# Patient Record
Sex: FEMALE | Race: BLACK OR AFRICAN AMERICAN | NOT HISPANIC OR LATINO | URBAN - METROPOLITAN AREA
[De-identification: names, ages, dates, MRNs, and addresses within clinical notes are randomized per-mention and may not be internally consistent; named-entity substitution may affect disease eponyms.]

---

## 2014-06-12 RX ORDER — SODIUM BICARBONATE 1 MEQ/ML
2 SYRINGE (ML) INTRAVENOUS
Qty: 0 | Refills: 0 | COMMUNITY
Start: 2014-06-12

## 2014-06-12 RX ORDER — SODIUM BICARBONATE 1 MEQ/ML
5 SYRINGE (ML) INTRAVENOUS
Qty: 0 | Refills: 0 | COMMUNITY
Start: 2014-06-12

## 2014-06-13 RX ORDER — ATORVASTATIN CALCIUM 80 MG/1
1 TABLET, FILM COATED ORAL
Qty: 0 | Refills: 0 | COMMUNITY
Start: 2014-06-13

## 2017-01-23 ENCOUNTER — OUTPATIENT (OUTPATIENT)
Dept: OUTPATIENT SERVICES | Facility: HOSPITAL | Age: 57
LOS: 1 days | End: 2017-01-23
Payer: COMMERCIAL

## 2017-01-23 VITALS
HEART RATE: 82 BPM | TEMPERATURE: 98 F | DIASTOLIC BLOOD PRESSURE: 86 MMHG | HEIGHT: 68 IN | RESPIRATION RATE: 18 BRPM | OXYGEN SATURATION: 99 % | SYSTOLIC BLOOD PRESSURE: 124 MMHG

## 2017-01-23 DIAGNOSIS — Z01.818 ENCOUNTER FOR OTHER PREPROCEDURAL EXAMINATION: ICD-10-CM

## 2017-01-23 DIAGNOSIS — N18.6 END STAGE RENAL DISEASE: ICD-10-CM

## 2017-01-23 DIAGNOSIS — N18.9 CHRONIC KIDNEY DISEASE, UNSPECIFIED: ICD-10-CM

## 2017-01-23 LAB
ANION GAP SERPL CALC-SCNC: 12 MMOL/L — SIGNIFICANT CHANGE UP (ref 5–17)
BLD GP AB SCN SERPL QL: SIGNIFICANT CHANGE UP
BUN SERPL-MCNC: 70 MG/DL — HIGH (ref 7–23)
CALCIUM SERPL-MCNC: 10.1 MG/DL — SIGNIFICANT CHANGE UP (ref 8.4–10.5)
CHLORIDE SERPL-SCNC: 102 MMOL/L — SIGNIFICANT CHANGE UP (ref 96–108)
CO2 SERPL-SCNC: 21 MMOL/L — LOW (ref 22–31)
CREAT SERPL-MCNC: 12.09 MG/DL — HIGH (ref 0.5–1.3)
GLUCOSE SERPL-MCNC: 123 MG/DL — HIGH (ref 70–99)
HCT VFR BLD CALC: 28.5 % — LOW (ref 34.5–45)
HGB BLD-MCNC: 8.1 G/DL — LOW (ref 11.5–15.5)
MCHC RBC-ENTMCNC: 23.6 PG — LOW (ref 27–34)
MCHC RBC-ENTMCNC: 28.6 GM/DL — LOW (ref 32–36)
MCV RBC AUTO: 82.5 FL — SIGNIFICANT CHANGE UP (ref 80–100)
PLATELET # BLD AUTO: 223 K/UL — SIGNIFICANT CHANGE UP (ref 150–400)
POTASSIUM SERPL-MCNC: 4.8 MMOL/L — SIGNIFICANT CHANGE UP (ref 3.5–5.3)
POTASSIUM SERPL-SCNC: 4.8 MMOL/L — SIGNIFICANT CHANGE UP (ref 3.5–5.3)
RBC # BLD: 3.46 M/UL — LOW (ref 3.8–5.2)
RBC # FLD: 23.3 % — HIGH (ref 10.3–14.5)
SODIUM SERPL-SCNC: 135 MMOL/L — SIGNIFICANT CHANGE UP (ref 135–145)
WBC # BLD: 6.6 K/UL — SIGNIFICANT CHANGE UP (ref 3.8–10.5)
WBC # FLD AUTO: 6.6 K/UL — SIGNIFICANT CHANGE UP (ref 3.8–10.5)

## 2017-01-23 PROCEDURE — 85027 COMPLETE CBC AUTOMATED: CPT

## 2017-01-23 PROCEDURE — 86901 BLOOD TYPING SEROLOGIC RH(D): CPT

## 2017-01-23 PROCEDURE — 36415 COLL VENOUS BLD VENIPUNCTURE: CPT

## 2017-01-23 PROCEDURE — 93005 ELECTROCARDIOGRAM TRACING: CPT

## 2017-01-23 PROCEDURE — 80048 BASIC METABOLIC PNL TOTAL CA: CPT

## 2017-01-23 PROCEDURE — 86900 BLOOD TYPING SEROLOGIC ABO: CPT

## 2017-01-23 PROCEDURE — G0463: CPT

## 2017-01-23 PROCEDURE — 86850 RBC ANTIBODY SCREEN: CPT

## 2017-01-23 PROCEDURE — 93010 ELECTROCARDIOGRAM REPORT: CPT

## 2017-01-23 NOTE — H&P PST ADULT - PMH
Breast CA, right    Chronic kidney disease (CKD)    Depression    Gout    Herniated lumbar intervertebral disc    Hypertension    Renal insufficiency    Spinal stenosis Anemia due to chronic kidney disease    Breast CA, right    Chronic kidney disease (CKD)    Depression    Gout    Herniated lumbar intervertebral disc    HLD (hyperlipidemia)    Hypertension    Renal insufficiency    Spinal stenosis

## 2017-01-23 NOTE — H&P PST ADULT - NSANTHOSAYNRD_GEN_A_CORE
No. SYED screening performed.  STOP BANG Legend: 0-2 = LOW Risk; 3-4 = INTERMEDIATE Risk; 5-8 = HIGH Risk

## 2017-01-23 NOTE — H&P PST ADULT - PROBLEM SELECTOR PLAN 1
scheduled left arm radiobephalic AV Fistula, possible right side  obtaining medical clearance  pre op instructions scheduled left arm radiocephalic AV Fistula, possible right side  obtaining medical clearance  pre op instructions

## 2017-02-02 NOTE — ASU DISCHARGE PLAN (ADULT/PEDIATRIC). - MEDICATION SUMMARY - MEDICATIONS TO TAKE
I will START or STAY ON the medications listed below when I get home from the hospital:    aspirin 81 mg oral delayed release tablet  -- 1 tab(s) by mouth once a day  -- Indication: For continuing home medications    acetaminophen-oxyCODONE 325 mg-5 mg oral tablet  -- 1 tab(s) by mouth every 4 hours, As Needed -for severe pain MDD:6  -- Caution federal law prohibits the transfer of this drug to any person other  than the person for whom it was prescribed.  May cause drowsiness.  Alcohol may intensify this effect.  Use care when operating dangerous machinery.  This prescription cannot be refilled.  This product contains acetaminophen.  Do not use  with any other product containing acetaminophen to prevent possible liver damage.  Using more of this medication than prescribed may cause serious breathing problems.    -- Indication: For moderate to severe pain    irbesartan 300 mg oral tablet  -- 1 tab(s) by mouth once a day  -- Indication: For continuing home medications    sodium bicarbonate 650 mg oral tablet  -- 5 tab(s) by mouth 2 times a day  -- Indication: For continuing home medications    cloNIDine 0.1 mg oral tablet  -- 1 tab(s) by mouth 2 times a day  -- Indication: For continuing home medications    sertraline 100 mg oral tablet  -- 1 tab(s) by mouth once a day  -- Indication: For continuing home medications    allopurinol  -- 400 milligram(s) by mouth once a day  -- Indication: For continuing home medications    atorvastatin 20 mg oral tablet  -- 1 tab(s) by mouth once a day (at bedtime)  -- Indication: For continuing home medications    Metoprolol Succinate ER 25 mg oral tablet, extended release  -- 1 tab(s) by mouth once a day  -- Indication: For continuing home medications    amLODIPine 5 mg oral tablet  -- 1 tab(s) by mouth once a day  -- Indication: For continuing home medications    furosemide 40 mg oral tablet  -- 1 tab(s) by mouth once a day  -- Indication: For continuing home medications    Renvela 800 mg oral tablet  -- 2 tab(s) by mouth 3 times a day (with meals)  -- Indication: For continuing home medications

## 2017-02-02 NOTE — ASU DISCHARGE PLAN (ADULT/PEDIATRIC). - NOTIFY
Numbness, tingling/Pain not relieved by Medications/Bleeding that does not stop/Numbness, color, or temperature change to extremity/Persistent Nausea and Vomiting

## 2017-02-02 NOTE — ASU DISCHARGE PLAN (ADULT/PEDIATRIC). - NURSING INSTRUCTIONS
Take pain medication as needed, Keep dressing to left forearm clean and dry , Make a follow up appointment with MD,

## 2017-02-02 NOTE — ASU DISCHARGE PLAN (ADULT/PEDIATRIC). - BATHING
May shower after 48 hours. Cover dressing with plastic to keep it dry when showering/shower only May shower after 24 hours. Cover dressing with plastic to keep it dry when showering/shower only

## 2017-02-02 NOTE — ASU DISCHARGE PLAN (ADULT/PEDIATRIC). - FOLLOWUP APPOINTMENT CLINIC/PHYSICIAN
Call Dr. Tse's office at [phone] to make an appointment to be seen within 7- 10 days Call Dr. Tse's office at [phone] to make an appointment to be seen within 7 days

## 2017-02-02 NOTE — ASU DISCHARGE PLAN (ADULT/PEDIATRIC). - INSTRUCTIONS
Follow all verbal and written instructions. Take medications as prescribed. DO NOT drive, operate machinery, and/or make important decisions while on prescription pain medication. DO NOT hesitate to call Doctor's office with questions or concerns. Certain prescription pain medication can cause constipation; take a stool softener such as Colace 100mg 3 x a day, to avoid the constipating effects of prescription pain medication.

## 2017-02-07 NOTE — ASU PATIENT PROFILE, ADULT - PMH
Anemia due to chronic kidney disease    Breast CA, right    Chronic kidney disease (CKD)    Depression    Gout    Herniated lumbar intervertebral disc    HLD (hyperlipidemia)    Hypertension    Renal insufficiency    Spinal stenosis

## 2017-02-07 NOTE — ASU PATIENT PROFILE, ADULT - TEACHING/LEARNING LEARNING PREFERENCES
verbal instruction/audio/written material/computer/internet/skill demonstration/pictorial/video/individual instruction/group instruction

## 2017-02-08 ENCOUNTER — OUTPATIENT (OUTPATIENT)
Dept: OUTPATIENT SERVICES | Facility: HOSPITAL | Age: 57
LOS: 1 days | End: 2017-02-08
Payer: COMMERCIAL

## 2017-02-08 ENCOUNTER — TRANSCRIPTION ENCOUNTER (OUTPATIENT)
Age: 57
End: 2017-02-08

## 2017-02-08 VITALS
SYSTOLIC BLOOD PRESSURE: 152 MMHG | HEART RATE: 98 BPM | DIASTOLIC BLOOD PRESSURE: 98 MMHG | WEIGHT: 179.02 LBS | OXYGEN SATURATION: 99 % | TEMPERATURE: 99 F | HEIGHT: 69 IN | RESPIRATION RATE: 18 BRPM

## 2017-02-08 VITALS
OXYGEN SATURATION: 98 % | HEART RATE: 88 BPM | RESPIRATION RATE: 18 BRPM | DIASTOLIC BLOOD PRESSURE: 84 MMHG | SYSTOLIC BLOOD PRESSURE: 134 MMHG

## 2017-02-08 DIAGNOSIS — N18.6 END STAGE RENAL DISEASE: ICD-10-CM

## 2017-02-08 DIAGNOSIS — Z01.818 ENCOUNTER FOR OTHER PREPROCEDURAL EXAMINATION: ICD-10-CM

## 2017-02-08 PROCEDURE — 36820 AV FUSION/FOREARM VEIN: CPT | Mod: LT

## 2017-02-08 PROCEDURE — 36820 AV FUSION/FOREARM VEIN: CPT | Mod: AS,LT

## 2017-02-08 PROCEDURE — C1889: CPT

## 2017-02-08 RX ORDER — HYDROMORPHONE HYDROCHLORIDE 2 MG/ML
0.5 INJECTION INTRAMUSCULAR; INTRAVENOUS; SUBCUTANEOUS
Qty: 0 | Refills: 0 | Status: DISCONTINUED | OUTPATIENT
Start: 2017-02-08 | End: 2017-02-08

## 2017-02-08 RX ORDER — SODIUM CHLORIDE 9 MG/ML
1000 INJECTION INTRAMUSCULAR; INTRAVENOUS; SUBCUTANEOUS
Qty: 0 | Refills: 0 | Status: DISCONTINUED | OUTPATIENT
Start: 2017-02-08 | End: 2017-02-23

## 2017-02-08 RX ORDER — ONDANSETRON 8 MG/1
4 TABLET, FILM COATED ORAL ONCE
Qty: 0 | Refills: 0 | Status: DISCONTINUED | OUTPATIENT
Start: 2017-02-08 | End: 2017-02-08

## 2017-02-08 RX ORDER — OXYCODONE HYDROCHLORIDE 5 MG/1
1 TABLET ORAL
Qty: 24 | Refills: 0 | OUTPATIENT
Start: 2017-02-08 | End: 2017-02-12

## 2017-02-08 RX ADMIN — SODIUM CHLORIDE 50 MILLILITER(S): 9 INJECTION INTRAMUSCULAR; INTRAVENOUS; SUBCUTANEOUS at 10:42

## 2017-06-06 ENCOUNTER — RESULT REVIEW (OUTPATIENT)
Age: 57
End: 2017-06-06

## 2018-11-21 ENCOUNTER — INPATIENT (INPATIENT)
Facility: HOSPITAL | Age: 58
LOS: 2 days | Discharge: HOME | DRG: 682 | End: 2018-11-24
Attending: INTERNAL MEDICINE | Admitting: INTERNAL MEDICINE
Payer: COMMERCIAL

## 2018-11-21 VITALS
WEIGHT: 158.73 LBS | OXYGEN SATURATION: 96 % | TEMPERATURE: 98 F | SYSTOLIC BLOOD PRESSURE: 202 MMHG | HEART RATE: 108 BPM | RESPIRATION RATE: 19 BRPM | DIASTOLIC BLOOD PRESSURE: 146 MMHG

## 2018-11-21 DIAGNOSIS — M10.9 GOUT, UNSPECIFIED: ICD-10-CM

## 2018-11-21 DIAGNOSIS — R11.2 NAUSEA WITH VOMITING, UNSPECIFIED: ICD-10-CM

## 2018-11-21 DIAGNOSIS — E78.2 MIXED HYPERLIPIDEMIA: ICD-10-CM

## 2018-11-21 DIAGNOSIS — N18.6 END STAGE RENAL DISEASE: ICD-10-CM

## 2018-11-21 DIAGNOSIS — F10.10 ALCOHOL ABUSE, UNCOMPLICATED: ICD-10-CM

## 2018-11-21 DIAGNOSIS — K85.90 ACUTE PANCREATITIS WITHOUT NECROSIS OR INFECTION, UNSPECIFIED: ICD-10-CM

## 2018-11-21 DIAGNOSIS — I16.0 HYPERTENSIVE URGENCY: ICD-10-CM

## 2018-11-21 PROBLEM — N18.9 CHRONIC KIDNEY DISEASE, UNSPECIFIED: Chronic | Status: ACTIVE | Noted: 2017-01-23

## 2018-11-21 PROBLEM — M51.26 OTHER INTERVERTEBRAL DISC DISPLACEMENT, LUMBAR REGION: Chronic | Status: ACTIVE | Noted: 2017-01-23

## 2018-11-21 PROBLEM — E78.5 HYPERLIPIDEMIA, UNSPECIFIED: Chronic | Status: ACTIVE | Noted: 2017-01-23

## 2018-11-21 PROBLEM — M48.00 SPINAL STENOSIS, SITE UNSPECIFIED: Chronic | Status: ACTIVE | Noted: 2017-01-23

## 2018-11-21 LAB
ALBUMIN SERPL ELPH-MCNC: 4.7 G/DL — SIGNIFICANT CHANGE UP (ref 3.3–5)
ALP SERPL-CCNC: 331 U/L — HIGH (ref 40–120)
ALT FLD-CCNC: 18 U/L — SIGNIFICANT CHANGE UP (ref 10–45)
ANION GAP SERPL CALC-SCNC: 23 MMOL/L — HIGH (ref 5–17)
APTT BLD: 29.8 SEC — SIGNIFICANT CHANGE UP (ref 27.5–36.3)
AST SERPL-CCNC: 25 U/L — SIGNIFICANT CHANGE UP (ref 10–40)
BASOPHILS # BLD AUTO: 0 K/UL — SIGNIFICANT CHANGE UP (ref 0–0.2)
BASOPHILS NFR BLD AUTO: 0.1 % — SIGNIFICANT CHANGE UP (ref 0–2)
BILIRUB SERPL-MCNC: 0.7 MG/DL — SIGNIFICANT CHANGE UP (ref 0.2–1.2)
BUN SERPL-MCNC: 55 MG/DL — HIGH (ref 7–23)
CALCIUM SERPL-MCNC: 8.4 MG/DL — SIGNIFICANT CHANGE UP (ref 8.4–10.5)
CHLORIDE SERPL-SCNC: 92 MMOL/L — LOW (ref 96–108)
CO2 SERPL-SCNC: 23 MMOL/L — SIGNIFICANT CHANGE UP (ref 22–31)
CREAT SERPL-MCNC: 7.26 MG/DL — HIGH (ref 0.5–1.3)
EOSINOPHIL # BLD AUTO: 0 K/UL — SIGNIFICANT CHANGE UP (ref 0–0.5)
EOSINOPHIL NFR BLD AUTO: 0.5 % — SIGNIFICANT CHANGE UP (ref 0–6)
GAS PNL BLDV: SIGNIFICANT CHANGE UP
GLUCOSE SERPL-MCNC: 137 MG/DL — HIGH (ref 70–99)
HCT VFR BLD CALC: 38.2 % — SIGNIFICANT CHANGE UP (ref 34.5–45)
HGB BLD-MCNC: 12.2 G/DL — SIGNIFICANT CHANGE UP (ref 11.5–15.5)
INR BLD: 1.08 RATIO — SIGNIFICANT CHANGE UP (ref 0.88–1.16)
LIDOCAIN IGE QN: 519 U/L — HIGH (ref 7–60)
LYMPHOCYTES # BLD AUTO: 0.9 K/UL — LOW (ref 1–3.3)
LYMPHOCYTES # BLD AUTO: 12.4 % — LOW (ref 13–44)
MAGNESIUM SERPL-MCNC: 2.2 MG/DL — SIGNIFICANT CHANGE UP (ref 1.6–2.6)
MCHC RBC-ENTMCNC: 28 PG — SIGNIFICANT CHANGE UP (ref 27–34)
MCHC RBC-ENTMCNC: 31.9 GM/DL — LOW (ref 32–36)
MCV RBC AUTO: 87.9 FL — SIGNIFICANT CHANGE UP (ref 80–100)
MONOCYTES # BLD AUTO: 0.8 K/UL — SIGNIFICANT CHANGE UP (ref 0–0.9)
MONOCYTES NFR BLD AUTO: 10.5 % — SIGNIFICANT CHANGE UP (ref 2–14)
NEUTROPHILS # BLD AUTO: 5.6 K/UL — SIGNIFICANT CHANGE UP (ref 1.8–7.4)
NEUTROPHILS NFR BLD AUTO: 76.5 % — SIGNIFICANT CHANGE UP (ref 43–77)
PHOSPHATE SERPL-MCNC: 5.2 MG/DL — HIGH (ref 2.5–4.5)
PLATELET # BLD AUTO: 143 K/UL — LOW (ref 150–400)
POTASSIUM SERPL-MCNC: 5.5 MMOL/L — HIGH (ref 3.5–5.3)
POTASSIUM SERPL-SCNC: 5.5 MMOL/L — HIGH (ref 3.5–5.3)
PROT SERPL-MCNC: 8.5 G/DL — HIGH (ref 6–8.3)
PROTHROM AB SERPL-ACNC: 12.3 SEC — SIGNIFICANT CHANGE UP (ref 10–12.9)
RBC # BLD: 4.34 M/UL — SIGNIFICANT CHANGE UP (ref 3.8–5.2)
RBC # FLD: 20.9 % — HIGH (ref 10.3–14.5)
SODIUM SERPL-SCNC: 138 MMOL/L — SIGNIFICANT CHANGE UP (ref 135–145)
TROPONIN T, HIGH SENSITIVITY RESULT: 73 NG/L — HIGH (ref 0–51)
TROPONIN T, HIGH SENSITIVITY RESULT: 77 NG/L — HIGH (ref 0–51)
WBC # BLD: 7.3 K/UL — SIGNIFICANT CHANGE UP (ref 3.8–10.5)
WBC # FLD AUTO: 7.3 K/UL — SIGNIFICANT CHANGE UP (ref 3.8–10.5)

## 2018-11-21 PROCEDURE — 74018 RADEX ABDOMEN 1 VIEW: CPT | Mod: 26

## 2018-11-21 PROCEDURE — 99285 EMERGENCY DEPT VISIT HI MDM: CPT | Mod: 25

## 2018-11-21 PROCEDURE — 93010 ELECTROCARDIOGRAM REPORT: CPT

## 2018-11-21 PROCEDURE — 99223 1ST HOSP IP/OBS HIGH 75: CPT

## 2018-11-21 RX ORDER — FAMOTIDINE 10 MG/ML
20 INJECTION INTRAVENOUS DAILY
Qty: 0 | Refills: 0 | Status: DISCONTINUED | OUTPATIENT
Start: 2018-11-21 | End: 2018-11-24

## 2018-11-21 RX ORDER — FUROSEMIDE 40 MG
40 TABLET ORAL DAILY
Qty: 0 | Refills: 0 | Status: DISCONTINUED | OUTPATIENT
Start: 2018-11-21 | End: 2018-11-24

## 2018-11-21 RX ORDER — LOSARTAN POTASSIUM 100 MG/1
100 TABLET, FILM COATED ORAL DAILY
Qty: 0 | Refills: 0 | Status: DISCONTINUED | OUTPATIENT
Start: 2018-11-21 | End: 2018-11-24

## 2018-11-21 RX ORDER — CALCITRIOL 0.5 UG/1
1 CAPSULE ORAL DAILY
Qty: 0 | Refills: 0 | Status: DISCONTINUED | OUTPATIENT
Start: 2018-11-21 | End: 2018-11-21

## 2018-11-21 RX ORDER — ONDANSETRON 8 MG/1
4 TABLET, FILM COATED ORAL EVERY 8 HOURS
Qty: 0 | Refills: 0 | Status: DISCONTINUED | OUTPATIENT
Start: 2018-11-21 | End: 2018-11-24

## 2018-11-21 RX ORDER — METOPROLOL TARTRATE 50 MG
100 TABLET ORAL
Qty: 0 | Refills: 0 | Status: DISCONTINUED | OUTPATIENT
Start: 2018-11-21 | End: 2018-11-24

## 2018-11-21 RX ORDER — SODIUM CHLORIDE 9 MG/ML
1000 INJECTION INTRAMUSCULAR; INTRAVENOUS; SUBCUTANEOUS ONCE
Qty: 0 | Refills: 0 | Status: DISCONTINUED | OUTPATIENT
Start: 2018-11-21 | End: 2018-11-21

## 2018-11-21 RX ORDER — ATORVASTATIN CALCIUM 80 MG/1
40 TABLET, FILM COATED ORAL AT BEDTIME
Qty: 0 | Refills: 0 | Status: DISCONTINUED | OUTPATIENT
Start: 2018-11-21 | End: 2018-11-24

## 2018-11-21 RX ORDER — SODIUM BICARBONATE 1 MEQ/ML
1300 SYRINGE (ML) INTRAVENOUS DAILY
Qty: 0 | Refills: 0 | Status: DISCONTINUED | OUTPATIENT
Start: 2018-11-21 | End: 2018-11-23

## 2018-11-21 RX ORDER — LABETALOL HCL 100 MG
10 TABLET ORAL ONCE
Qty: 0 | Refills: 0 | Status: COMPLETED | OUTPATIENT
Start: 2018-11-21 | End: 2018-11-21

## 2018-11-21 RX ORDER — ALLOPURINOL 300 MG
400 TABLET ORAL DAILY
Qty: 0 | Refills: 0 | Status: DISCONTINUED | OUTPATIENT
Start: 2018-11-21 | End: 2018-11-21

## 2018-11-21 RX ORDER — ALLOPURINOL 300 MG
100 TABLET ORAL DAILY
Qty: 0 | Refills: 0 | Status: DISCONTINUED | OUTPATIENT
Start: 2018-11-21 | End: 2018-11-24

## 2018-11-21 RX ORDER — ASPIRIN/CALCIUM CARB/MAGNESIUM 324 MG
81 TABLET ORAL DAILY
Qty: 0 | Refills: 0 | Status: DISCONTINUED | OUTPATIENT
Start: 2018-11-21 | End: 2018-11-24

## 2018-11-21 RX ORDER — CALCITRIOL 0.5 UG/1
1 CAPSULE ORAL DAILY
Qty: 0 | Refills: 0 | Status: DISCONTINUED | OUTPATIENT
Start: 2018-11-21 | End: 2018-11-24

## 2018-11-21 RX ORDER — LOSARTAN POTASSIUM 100 MG/1
100 TABLET, FILM COATED ORAL DAILY
Qty: 0 | Refills: 0 | Status: DISCONTINUED | OUTPATIENT
Start: 2018-11-21 | End: 2018-11-21

## 2018-11-21 RX ORDER — ACETAMINOPHEN 500 MG
1000 TABLET ORAL ONCE
Qty: 0 | Refills: 0 | Status: COMPLETED | OUTPATIENT
Start: 2018-11-21 | End: 2018-11-21

## 2018-11-21 RX ORDER — TRAMADOL HYDROCHLORIDE 50 MG/1
25 TABLET ORAL ONCE
Qty: 0 | Refills: 0 | Status: DISCONTINUED | OUTPATIENT
Start: 2018-11-21 | End: 2018-11-21

## 2018-11-21 RX ORDER — METOCLOPRAMIDE HCL 10 MG
5 TABLET ORAL EVERY 8 HOURS
Qty: 0 | Refills: 0 | Status: DISCONTINUED | OUTPATIENT
Start: 2018-11-21 | End: 2018-11-24

## 2018-11-21 RX ORDER — AMLODIPINE BESYLATE 2.5 MG/1
10 TABLET ORAL DAILY
Qty: 0 | Refills: 0 | Status: DISCONTINUED | OUTPATIENT
Start: 2018-11-21 | End: 2018-11-24

## 2018-11-21 RX ORDER — SEVELAMER CARBONATE 2400 MG/1
1600 POWDER, FOR SUSPENSION ORAL
Qty: 0 | Refills: 0 | Status: DISCONTINUED | OUTPATIENT
Start: 2018-11-21 | End: 2018-11-24

## 2018-11-21 RX ORDER — CHOLECALCIFEROL (VITAMIN D3) 125 MCG
2000 CAPSULE ORAL
Qty: 0 | Refills: 0 | Status: DISCONTINUED | OUTPATIENT
Start: 2018-11-21 | End: 2018-11-24

## 2018-11-21 RX ORDER — ONDANSETRON 8 MG/1
4 TABLET, FILM COATED ORAL ONCE
Qty: 0 | Refills: 0 | Status: COMPLETED | OUTPATIENT
Start: 2018-11-21 | End: 2018-11-21

## 2018-11-21 RX ORDER — SODIUM CHLORIDE 9 MG/ML
1000 INJECTION INTRAMUSCULAR; INTRAVENOUS; SUBCUTANEOUS ONCE
Qty: 0 | Refills: 0 | Status: COMPLETED | OUTPATIENT
Start: 2018-11-21 | End: 2018-11-21

## 2018-11-21 RX ORDER — AMLODIPINE BESYLATE 2.5 MG/1
1 TABLET ORAL
Qty: 0 | Refills: 0 | COMMUNITY

## 2018-11-21 RX ORDER — SERTRALINE 25 MG/1
100 TABLET, FILM COATED ORAL DAILY
Qty: 0 | Refills: 0 | Status: DISCONTINUED | OUTPATIENT
Start: 2018-11-21 | End: 2018-11-24

## 2018-11-21 RX ADMIN — Medication 1000 MILLIGRAM(S): at 13:45

## 2018-11-21 RX ADMIN — Medication 400 MILLIGRAM(S): at 12:57

## 2018-11-21 RX ADMIN — SODIUM CHLORIDE 1000 MILLILITER(S): 9 INJECTION INTRAMUSCULAR; INTRAVENOUS; SUBCUTANEOUS at 14:15

## 2018-11-21 RX ADMIN — Medication 10 MILLIGRAM(S): at 16:59

## 2018-11-21 RX ADMIN — Medication 5 MILLIGRAM(S): at 16:59

## 2018-11-21 RX ADMIN — AMLODIPINE BESYLATE 10 MILLIGRAM(S): 2.5 TABLET ORAL at 20:17

## 2018-11-21 RX ADMIN — SODIUM CHLORIDE 1000 MILLILITER(S): 9 INJECTION INTRAMUSCULAR; INTRAVENOUS; SUBCUTANEOUS at 15:45

## 2018-11-21 RX ADMIN — CALCITRIOL 1 MICROGRAM(S): 0.5 CAPSULE ORAL at 22:57

## 2018-11-21 RX ADMIN — SERTRALINE 100 MILLIGRAM(S): 25 TABLET, FILM COATED ORAL at 22:57

## 2018-11-21 RX ADMIN — TRAMADOL HYDROCHLORIDE 25 MILLIGRAM(S): 50 TABLET ORAL at 15:00

## 2018-11-21 RX ADMIN — Medication 2000 UNIT(S): at 22:57

## 2018-11-21 RX ADMIN — Medication 1000 MILLIGRAM(S): at 13:20

## 2018-11-21 RX ADMIN — ONDANSETRON 4 MILLIGRAM(S): 8 TABLET, FILM COATED ORAL at 12:56

## 2018-11-21 RX ADMIN — ATORVASTATIN CALCIUM 40 MILLIGRAM(S): 80 TABLET, FILM COATED ORAL at 22:57

## 2018-11-21 RX ADMIN — Medication 100 MILLIGRAM(S): at 22:58

## 2018-11-21 RX ADMIN — LOSARTAN POTASSIUM 100 MILLIGRAM(S): 100 TABLET, FILM COATED ORAL at 22:57

## 2018-11-21 RX ADMIN — TRAMADOL HYDROCHLORIDE 25 MILLIGRAM(S): 50 TABLET ORAL at 14:14

## 2018-11-21 RX ADMIN — Medication 0.2 MILLIGRAM(S): at 19:24

## 2018-11-21 RX ADMIN — Medication 100 MILLIGRAM(S): at 22:57

## 2018-11-21 NOTE — ED PROVIDER NOTE - PROGRESS NOTE DETAILS
Spoke w/ on-call attending for Dr. Waters and informed him of admission. He informed me Dr. Jansen ([phone]) covers for them nephrology wise and will see that patient and set up dialysis. Paged [facility] for [facility] for pancreatitis. MD aware. - Julio Madsen PA-C Pt accepted to Dr. Henning [facility] hospitalist who accepted pt under her service. Informed her of all lab results, pt presentation and that Dr. Howard' associate informed that Dr. Jansen was nephrologist covering and was to set up dialysis for patient today. Sent call out to Dr. Jansen to confirm. Awaiting return call. - Julio Madsen PA-C

## 2018-11-21 NOTE — ED ADULT NURSE NOTE - NSIMPLEMENTINTERV_GEN_ALL_ED
Implemented All Universal Safety Interventions:  [place] to call system. Call bell, personal items and telephone within reach. Instruct patient to call for assistance. Room bathroom lighting operational. Non-slip footwear when patient is off stretcher. Physically safe environment: no spills, clutter or unnecessary equipment. Stretcher in lowest position, wheels locked, appropriate side rails in place.

## 2018-11-21 NOTE — CONSULT NOTE ADULT - ASSESSMENT
57 yr old F w/a hx of ESRD, uncontrolled HTN, gout, HLD and s/p r lumpectomy presents with nausea and vomiting., pancreatitis and hyperkalemia    1- esrd  2- hyperkalemia  3- pancreatitis  4- N/V  5- uncontrolled htn     will tx hyperkalemia with hd   hd consent obtained witnessed and placed in chart  hold phosphate binders in setting of gi symptoms  given tachy and htn may be beginning of alcohol withdrawal. wa protocol

## 2018-11-21 NOTE — H&P ADULT - PROBLEM SELECTOR PLAN 3
Lipase is mildly elevated, however patient without classic epigastric pain,  she reports only mild ETOH use,  abdominal pain also started after vomiting episodes located in left upper quadrant non consistent with pancreatitis.      IV zofran Prn for nausea.  Full liquid diet for now.  Will advance.

## 2018-11-21 NOTE — ED ADULT NURSE REASSESSMENT NOTE - NS ED NURSE REASSESS COMMENT FT1
Report given to Cande BRAND.   RN aware of BP, and interventions.  Pt has improved.  Pending bed upstairs.

## 2018-11-21 NOTE — ED PROVIDER NOTE - OBJECTIVE STATEMENT
56 yo female PMHx HTN, CKD on HD T,Th,Sat normally (MWF this week d/t holiday), gout, HLD, breast CA presents to ED c/o nausea and LUQ abdominal pain that began this morning when she woke up. States she felt nausea first, then had several episodes of clear nb/nb vomiting, then began to notice the LUQ pain described as a soreness. Went to her dialysis appointment this AM but was advised to come to ED d/t symptoms, did not undergo dialysis there today. Endorses subjective chills. Last BM was yesterday and of normal character. No hx abdominal surgeries, cp, sob, dyspnea on exertion, fatigue, diarrhea, constipation, sick contacts, recent hospitalization/travel, back pain.

## 2018-11-21 NOTE — H&P ADULT - ASSESSMENT
56 yo female PMHx HTN, CKD on HD T,Th,Sat normally (MWF this week d/t holiday), gout, HLD, breast CA presents to ED c/o nausea

## 2018-11-21 NOTE — CONSULT NOTE ADULT - SUBJECTIVE AND OBJECTIVE BOX
[facility]  [phone]  NEPHROLOGY      INITIAL CONSULT NOTE  --------------------------------------------------------------------------------  HPI:    57 yr old F w/a hx of ESRD, uncontrolled HTN, gout, HLD and s/p r lumpectomy presents with nausea and vomiting.  Patient reports feeling in her normal state of health up until this morning when she woke up feeling nauseous and subsequently had several episodes of non bloody non bilious vomiting.  She states she went to her dialysis center and there was found to be very hypertensive and was sent to the ED without undergoing dialysis.  renal consult called     PAST HISTORY  --------------------------------------------------------------------------------  PAST MEDICAL & SURGICAL HISTORY:  HLD (hyperlipidemia)  Anemia due to chronic kidney disease  Spinal stenosis  Herniated lumbar intervertebral disc  Chronic kidney disease (CKD)  Gout  Depression  Breast CA, right  Hypertension  Renal insufficiency  S/P lumpectomy, right breast    FAMILY HISTORY:  No pertinent family history in first degree relatives    PAST SOCIAL HISTORY: + alcohol use. last alcohol 2 d ago     ALLERGIES & MEDICATIONS  --------------------------------------------------------------------------------  Allergies    penicillin (Unknown)    Intolerances      Standing Inpatient Medications  allopurinol 100 milliGRAM(s) Oral daily  amLODIPine   Tablet 10 milliGRAM(s) Oral daily  aspirin enteric coated 81 milliGRAM(s) Oral daily  atorvastatin 40 milliGRAM(s) Oral at bedtime  calcitriol   Capsule 1 MICROGram(s) Oral daily  cholecalciferol 2000 Unit(s) Oral two times a day  cloNIDine 0.2 milliGRAM(s) Oral three times a day  famotidine Injectable 20 milliGRAM(s) IV Push daily  furosemide    Tablet 40 milliGRAM(s) Oral daily  losartan 100 milliGRAM(s) Oral daily  metoprolol tartrate 100 milliGRAM(s) Oral two times a day  sertraline 100 milliGRAM(s) Oral daily  sevelamer hydrochloride 1600 milliGRAM(s) Oral three times a day with meals  sodium bicarbonate 1300 milliGRAM(s) Oral daily    PRN Inpatient Medications  metoclopramide Injectable 5 milliGRAM(s) IV Push every 8 hours PRN  ondansetron Injectable 4 milliGRAM(s) IV Push every 8 hours PRN      REVIEW OF SYSTEMS  --------------------------------------------------------------------------------  Gen: No  fevers/chills   Skin: No rashes  Head/Eyes/Ears/Mouth: No headache; Normal hearing;  No sinus pain/discomfort, sore throat  Respiratory: No dyspnea, cough, wheezing, hemoptysis  CV: No chest pain, orthopnea  GI: +  abdominal pain, diarrhea, + nausea, + vomiting, melena, hematochezia  : No dysuria  MSK: No joint pain/swelling; no back pain  Neuro: No dizziness/lightheadedness,  also with no edema     All other systems were reviewed and are negative, except as noted.    VITALS/PHYSICAL EXAM  --------------------------------------------------------------------------------  T(C): 36.5 (11-21-18 @ 17:37), Max: 36.9 (11-21-18 @ 14:15)  HR: 100 (11-21-18 @ 17:37) (95 - 118)  BP: 156/107 (11-21-18 @ 17:37) (156/107 - 220/150)  RR: 18 (11-21-18 @ 17:37) (18 - 20)  SpO2: 97% (11-21-18 @ 17:37) (95% - 100%)  Wt(kg): --  Height (cm): 154.43 (11-21-18 @ 12:42)  Weight (kg): 72 (11-21-18 @ 11:54)  BMI (kg/m2): 30.2 (11-21-18 @ 12:42)  BSA (m2): 1.71 (11-21-18 @ 12:42)      Physical Exam:  	Gen: Non toxic comfortable appearing   	no jvd , supple neck,   	Pulm: decrease bs  no rales or ronchi or wheezing  	CV: RRR, S1S2; no rub  	Back: No CVA tenderness; no sacral edema  	Abd: +BS, soft, nontender/nondistended  	: No suprapubic tenderness  	UE: Warm, no cyanosis  no clubbing,  no edema  	LE: Warm, no cyanosis  no clubbing, no edema  	Neuro: alert and oriented. speech coherent   	Psych: Normal affect and mood  	Skin: Warm, no decrease skin turgor   	Vascular access: LUE + avf + bruit and thrill     LABS/STUDIES  --------------------------------------------------------------------------------              12.2   7.3   >-----------<  143      [11-21-18 @ 13:09]              38.2     138  |  92  |  55  ----------------------------<  137      [11-21-18 @ 13:09]  5.5   |  23  |  7.26        Ca     8.4     [11-21-18 @ 13:09]      Mg     2.2     [11-21-18 @ 13:09]      Phos  5.2     [11-21-18 @ 13:09]    TPro  8.5  /  Alb  4.7  /  TBili  0.7  /  DBili  x   /  AST  25  /  ALT  18  /  AlkPhos  331  [11-21-18 @ 13:09]    PT/INR: PT 12.3 , INR 1.08       [11-21-18 @ 13:09]  PTT: 29.8       [11-21-18 @ 13:09]      Creatinine Trend:  SCr 7.26 [11-21 @ 13:09]    Urinalysis - [02-17-16 @ 17:36]      Color Yellow / Appearance Slightly Turbid / SG 1.013 / pH 6.0      Gluc Negative / Ketone Negative  / Bili Negative / Urobili Negative       Blood Negative / Protein 300 / Leuk Est Negative / Nitrite Negative      RBC 1 / WBC 6 / Hyaline 2 / Gran  / Sq Epi  / Non Sq Epi 25 / Bacteria Occasional      HbA1c 5.3      [02-17-16 @ 17:41]    HBsAb <3.0      [02-17-16 @ 17:36]  HBsAb Nonreact      [02-17-16 @ 17:36]  HBsAg Nonreact      [02-17-16 @ 17:36]  HBcAb Nonreact      [02-17-16 @ 17:36]  HCV 0.07, Nonreact      [02-17-16 @ 17:36]  HIV Nonreact      [02-17-16 @ 17:41]

## 2018-11-21 NOTE — ED PROVIDER NOTE - NONTENDER LOCATION
Abdomen without evidence of ecchymosis, deformity. No pulsatile masses. Abdomen soft, nontender nondistended all abdominal quadrants with no rebound, guarding or rigidity in all quadrants. No McBurney's point tenderness, negative Rovsing's, negative Nichole's.

## 2018-11-21 NOTE — H&P ADULT - HISTORY OF PRESENT ILLNESS
57 yr old F w/a hx of ESRD, uncontrolled HTN, gout, HLD and s/p r lumpectomy presents with nausea and vomiting.  Patient reports feeling in her normal state of health up until this morning when she woke up feeling nauseous and subsequently had several episodes of non bloody non bilious vomiting.  She states she went to her dialysis center and there was found to be very hypertensive and was sent to the ED without undergoing dialysis.  patient denies any diarrhea no chest pain or shortness of breath

## 2018-11-21 NOTE — H&P ADULT - NSHPLABSRESULTS_GEN_ALL_CORE
LABS:                        12.2   7.3   )-----------( 143      ( 21 Nov 2018 13:09 )             38.2     11-21    138  |  92<L>  |  55<H>  ----------------------------<  137<H>  5.5<H>   |  23  |  7.26<H>    Ca    8.4      21 Nov 2018 13:09  Phos  5.2     11-21  Mg     2.2     11-21    TPro  8.5<H>  /  Alb  4.7  /  TBili  0.7  /  DBili  x   /  AST  25  /  ALT  18  /  AlkPhos  331<H>  11-21    PT/INR - ( 21 Nov 2018 13:09 )   PT: 12.3 sec;   INR: 1.08 ratio         PTT - ( 21 Nov 2018 13:09 )  PTT:29.8 sec

## 2018-11-21 NOTE — ED ADULT NURSE REASSESSMENT NOTE - NS ED NURSE REASSESS COMMENT FT1
4952 Report received from covering RN Celina Davis. Pt vomiting clear liquid. c/o abd pain. A&Ox4. AV fist left arm +thrill. denies fever or chills. awaiting urine sample. abd sore to palp diffusely

## 2018-11-21 NOTE — ED PROVIDER NOTE - ATTENDING CONTRIBUTION TO CARE
Patient is a 58 yo F with hsitory of HTN, hyperlipidemia, ESRD T/H/S - switched this week to M/W/F, here for left upper abdominal pain since this morning. She reports nausea, non-bilious, non-bloody vomiting. She went to HD but was not dialyzed because of the vomiting and sent in for evaluation. Denies chest pain, sob, dizziness, fevers. + chills. Denies abdominal surgeries.     VS noted  Gen. no acute distress, Non toxic   HEENT: EOMI, mmm,   Lungs: CTAB/L no C/ W /R   CVS: RRR   Abd; Soft non tender, non distended   Ext: no edema  Skin: no rash  Neuro AAOx3 non focal clear speech  a/p: abd pain - vomiting  - Steve HYATT Patient is a 56 yo F with hsitory of HTN, hyperlipidemia, ESRD T/H/S - switched this week to M/W/F, here for left upper abdominal pain since this morning. She reports nausea, non-bilious, non-bloody vomiting. She went to HD but was not dialyzed because of the vomiting and sent in for evaluation. Denies chest pain, sob, dizziness, fevers. + chills. Denies abdominal surgeries. Patient still makes urine, denies dysuria.     VS noted  Gen. no acute distress, Non toxic   HEENT: EOMI, mmm,   Lungs: CTAB/L no C/ W /R   CVS: RRR   Abd; Soft non tender, non distended, no rebound or guarding.   Ext: no edema  Skin: no rash  Neuro AAOx3 non focal clear speech  a/p: abd pain - vomiting, abd soft - will check labs, give nausea medication. Pt missed HD today and tomorrow a holiday - she will need to be admitted.   - Steve HYATT

## 2018-11-21 NOTE — H&P ADULT - PROBLEM SELECTOR PLAN 6
Reports 2-3 drinks per week with vodka no w/d seizures or DT's  CIWA protocol as last drink 48 hours ago.

## 2018-11-22 LAB
ALBUMIN SERPL ELPH-MCNC: 4.6 G/DL — SIGNIFICANT CHANGE UP (ref 3.3–5)
ALP SERPL-CCNC: 307 U/L — HIGH (ref 40–120)
ALT FLD-CCNC: 29 U/L — SIGNIFICANT CHANGE UP (ref 10–45)
ANION GAP SERPL CALC-SCNC: 19 MMOL/L — HIGH (ref 5–17)
AST SERPL-CCNC: 53 U/L — HIGH (ref 10–40)
BILIRUB SERPL-MCNC: 1 MG/DL — SIGNIFICANT CHANGE UP (ref 0.2–1.2)
BUN SERPL-MCNC: 34 MG/DL — HIGH (ref 7–23)
CALCIUM SERPL-MCNC: 8.7 MG/DL — SIGNIFICANT CHANGE UP (ref 8.4–10.5)
CHLORIDE SERPL-SCNC: 92 MMOL/L — LOW (ref 96–108)
CO2 SERPL-SCNC: 25 MMOL/L — SIGNIFICANT CHANGE UP (ref 22–31)
CREAT SERPL-MCNC: 5.11 MG/DL — HIGH (ref 0.5–1.3)
GLUCOSE SERPL-MCNC: 103 MG/DL — HIGH (ref 70–99)
HCT VFR BLD CALC: 34 % — LOW (ref 34.5–45)
HGB BLD-MCNC: 10.6 G/DL — LOW (ref 11.5–15.5)
MAGNESIUM SERPL-MCNC: 2.2 MG/DL — SIGNIFICANT CHANGE UP (ref 1.6–2.6)
MCHC RBC-ENTMCNC: 27.5 PG — SIGNIFICANT CHANGE UP (ref 27–34)
MCHC RBC-ENTMCNC: 31.2 GM/DL — LOW (ref 32–36)
MCV RBC AUTO: 88.1 FL — SIGNIFICANT CHANGE UP (ref 80–100)
PHOSPHATE SERPL-MCNC: 4.4 MG/DL — SIGNIFICANT CHANGE UP (ref 2.5–4.5)
PLATELET # BLD AUTO: 147 K/UL — LOW (ref 150–400)
POTASSIUM SERPL-MCNC: 3.9 MMOL/L — SIGNIFICANT CHANGE UP (ref 3.5–5.3)
POTASSIUM SERPL-SCNC: 3.9 MMOL/L — SIGNIFICANT CHANGE UP (ref 3.5–5.3)
PROT SERPL-MCNC: 8 G/DL — SIGNIFICANT CHANGE UP (ref 6–8.3)
RBC # BLD: 3.86 M/UL — SIGNIFICANT CHANGE UP (ref 3.8–5.2)
RBC # FLD: 19.7 % — HIGH (ref 10.3–14.5)
SODIUM SERPL-SCNC: 136 MMOL/L — SIGNIFICANT CHANGE UP (ref 135–145)
WBC # BLD: 8.27 K/UL — SIGNIFICANT CHANGE UP (ref 3.8–10.5)
WBC # FLD AUTO: 8.27 K/UL — SIGNIFICANT CHANGE UP (ref 3.8–10.5)

## 2018-11-22 PROCEDURE — 71045 X-RAY EXAM CHEST 1 VIEW: CPT | Mod: 26

## 2018-11-22 RX ADMIN — ATORVASTATIN CALCIUM 40 MILLIGRAM(S): 80 TABLET, FILM COATED ORAL at 21:39

## 2018-11-22 RX ADMIN — Medication 2000 UNIT(S): at 17:10

## 2018-11-22 RX ADMIN — SEVELAMER CARBONATE 1600 MILLIGRAM(S): 2400 POWDER, FOR SUSPENSION ORAL at 08:17

## 2018-11-22 RX ADMIN — AMLODIPINE BESYLATE 10 MILLIGRAM(S): 2.5 TABLET ORAL at 11:14

## 2018-11-22 RX ADMIN — Medication 0.2 MILLIGRAM(S): at 21:39

## 2018-11-22 RX ADMIN — Medication 0.2 MILLIGRAM(S): at 14:03

## 2018-11-22 RX ADMIN — CALCITRIOL 1 MICROGRAM(S): 0.5 CAPSULE ORAL at 11:13

## 2018-11-22 RX ADMIN — SERTRALINE 100 MILLIGRAM(S): 25 TABLET, FILM COATED ORAL at 11:13

## 2018-11-22 RX ADMIN — Medication 100 MILLIGRAM(S): at 11:13

## 2018-11-22 RX ADMIN — SEVELAMER CARBONATE 1600 MILLIGRAM(S): 2400 POWDER, FOR SUSPENSION ORAL at 12:11

## 2018-11-22 RX ADMIN — FAMOTIDINE 20 MILLIGRAM(S): 10 INJECTION INTRAVENOUS at 14:03

## 2018-11-22 RX ADMIN — SEVELAMER CARBONATE 1600 MILLIGRAM(S): 2400 POWDER, FOR SUSPENSION ORAL at 17:10

## 2018-11-22 RX ADMIN — LOSARTAN POTASSIUM 100 MILLIGRAM(S): 100 TABLET, FILM COATED ORAL at 21:39

## 2018-11-22 RX ADMIN — Medication 1300 MILLIGRAM(S): at 11:12

## 2018-11-22 RX ADMIN — Medication 100 MILLIGRAM(S): at 23:35

## 2018-11-22 RX ADMIN — Medication 40 MILLIGRAM(S): at 06:48

## 2018-11-22 RX ADMIN — Medication 81 MILLIGRAM(S): at 11:13

## 2018-11-22 RX ADMIN — Medication 0.2 MILLIGRAM(S): at 06:48

## 2018-11-22 RX ADMIN — Medication 2000 UNIT(S): at 11:12

## 2018-11-22 NOTE — PROGRESS NOTE ADULT - PROBLEM SELECTOR PLAN 1
BP optimal this am  s/p HD last night  cont Metoprol  Losartan, Clonidine and Norvasc    EKG shows LVH tronopin mildly elevated likely secondary to HTN and inability to clear due to ESRD.

## 2018-11-22 NOTE — CONSULT NOTE ADULT - ASSESSMENT
57F ESRD 2/2 HTN on HD, presents with uncontrolled HTN, N/V/Abd pain with elevated lipase over 500.    Lipase may be elevated in the setting of ESRD and symptoms may be secondary to uncontrolled HTN . Will d/w renal about risk/benefit of CT contrast to assess pancreas. Patient on lasix so likely residual renal function and presumably would like to avoid contrast. Outpatient US 5/2018 without gallstones.  Patient reports ETOH use "a few drinks per week". If accurate, unlikely to cause pancreatitis.  Check Triglycerides  Check IgG4  Diet as tolerated.  If all negative patient on several medications with drug induced pancreatitis risk (furosemide, losartan, atorvastatin).    [phone]

## 2018-11-22 NOTE — CONSULT NOTE ADULT - SUBJECTIVE AND OBJECTIVE BOX
Patient is a 57y old  Female who presents with a chief complaint of HTN (22 Nov 2018 09:53)      HPI:  57 yr old F w/a hx of ESRD, uncontrolled HTN, gout, HLD and s/p r lumpectomy presents with nausea and vomiting.  Patient reports feeling in her normal state of health up until this morning when she woke up feeling nauseous and subsequently had several episodes of non bloody non bilious vomiting.  She states she went to her dialysis center and there was found to be very hypertensive and was sent to the ED without undergoing dialysis.  patient denies any diarrhea no chest pain or shortness of breath (21 Nov 2018 16:49)  Currently abd pain/vomiting improved tolerating PO diet.      PAST MEDICAL & SURGICAL HISTORY:  HLD (hyperlipidemia)  Anemia due to chronic kidney disease  Spinal stenosis  Herniated lumbar intervertebral disc  Chronic kidney disease (CKD)  Gout  Depression  Breast CA, right  Hypertension  Renal insufficiency  S/P lumpectomy, right breast      MEDICATIONS  (STANDING):  allopurinol 100 milliGRAM(s) Oral daily  amLODIPine   Tablet 10 milliGRAM(s) Oral daily  aspirin enteric coated 81 milliGRAM(s) Oral daily  atorvastatin 40 milliGRAM(s) Oral at bedtime  calcitriol   Capsule 1 MICROGram(s) Oral daily  cholecalciferol 2000 Unit(s) Oral two times a day  cloNIDine 0.2 milliGRAM(s) Oral three times a day  famotidine Injectable 20 milliGRAM(s) IV Push daily  furosemide    Tablet 40 milliGRAM(s) Oral daily  losartan 100 milliGRAM(s) Oral daily  metoprolol tartrate 100 milliGRAM(s) Oral two times a day  sertraline 100 milliGRAM(s) Oral daily  sevelamer hydrochloride 1600 milliGRAM(s) Oral three times a day with meals  sodium bicarbonate 1300 milliGRAM(s) Oral daily    MEDICATIONS  (PRN):  metoclopramide Injectable 5 milliGRAM(s) IV Push every 8 hours PRN Nausea and vomiting  ondansetron Injectable 4 milliGRAM(s) IV Push every 8 hours PRN Nausea and/or Vomiting      Allergies    penicillin (Unknown)    Intolerances        Review of Systems:    General:  see HPI  CV:  No pain, palpitations, hypo/hypertension  Resp:  No dyspnea, cough, tachypnea, wheezing  GI:  see HPI  :  No pain, bleeding, incontinence, nocturia  Muscle:  No pain, weakness  Neuro:  No weakness, tingling, memory problems  Psych:  No fatigue, insomnia, mood problems, depression  Endocrine:  No polyuria, polydypsia, cold/heat intolerance  Heme:  No petechiae, ecchymosis, easy bruisability  Skin:  No rash, tattoos, scars, edema      Vital Signs Last 24 Hrs  T(C): 36.9 (22 Nov 2018 11:55), Max: 37.1 (22 Nov 2018 04:24)  T(F): 98.5 (22 Nov 2018 11:55), Max: 98.8 (22 Nov 2018 04:24)  HR: 80 (22 Nov 2018 11:55) (80 - 118)  BP: 167/98 (22 Nov 2018 11:55) (149/85 - 220/150)  BP(mean): --  RR: 18 (22 Nov 2018 11:55) (17 - 18)  SpO2: 100% (22 Nov 2018 11:55) (95% - 100%)    PHYSICAL EXAM:    Constitutional: NAD, well-developed  Neck: No LAD, supple  Respiratory: CTA and P  Cardiovascular: S1 and S2, RRR, no M  Gastrointestinal: BS+, soft, NT/ND, neg HSM,  Extremities: No peripheral edema, neg clubbing, cyanosis  Vascular: 2+ peripheral pulses  Neurological: A/O x 3, no focal deficits  Psychiatric: Normal mood, normal affect  Skin: No rashes      LABS:                        10.6   8.27  )-----------( 147      ( 22 Nov 2018 08:24 )             34.0                         12.2   7.3   )-----------( 143      ( 21 Nov 2018 13:09 )             38.2     11-22    136  |  92<L>  |  34<H>  ----------------------------<  103<H>  3.9   |  25  |  5.11<H>    Ca    8.7      22 Nov 2018 07:35  Phos  4.4     11-22  Mg     2.2     11-22    TPro  8.0  /  Alb  4.6  /  TBili  1.0  /  DBili  x   /  AST  53<H>  /  ALT  29  /  AlkPhos  307<H>  11-22    PT/INR - ( 21 Nov 2018 13:09 )   PT: 12.3 sec;   INR: 1.08 ratio         PTT - ( 21 Nov 2018 13:09 )  PTT:29.8 sec    LIVER FUNCTIONS - ( 22 Nov 2018 07:35 )  Alb: 4.6 g/dL / Pro: 8.0 g/dL / ALK PHOS: 307 U/L / ALT: 29 U/L / AST: 53 U/L / GGT: x         LIVER FUNCTIONS - ( 21 Nov 2018 13:09 )  Alb: 4.7 g/dL / Pro: 8.5 g/dL / ALK PHOS: 331 U/L / ALT: 18 U/L / AST: 25 U/L / GGT: x           Lipase, Serum: 519 U/L (11-21-18 @ 13:09)        RADIOLOGY & ADDITIONAL TESTS:

## 2018-11-23 ENCOUNTER — TRANSCRIPTION ENCOUNTER (OUTPATIENT)
Age: 58
End: 2018-11-23

## 2018-11-23 LAB
CHOLEST SERPL-MCNC: 126 MG/DL — SIGNIFICANT CHANGE UP (ref 10–199)
HDLC SERPL-MCNC: 66 MG/DL — SIGNIFICANT CHANGE UP
LIPID PNL WITH DIRECT LDL SERPL: 46 MG/DL — SIGNIFICANT CHANGE UP
MAGNESIUM SERPL-MCNC: 2.2 MG/DL — SIGNIFICANT CHANGE UP (ref 1.6–2.6)
PHOSPHATE SERPL-MCNC: 4.5 MG/DL — SIGNIFICANT CHANGE UP (ref 2.5–4.5)
TOTAL CHOLESTEROL/HDL RATIO MEASUREMENT: 1.9 RATIO — LOW (ref 3.3–7.1)
TRIGL SERPL-MCNC: 72 MG/DL — SIGNIFICANT CHANGE UP (ref 10–149)

## 2018-11-23 PROCEDURE — 74177 CT ABD & PELVIS W/CONTRAST: CPT | Mod: 26

## 2018-11-23 RX ORDER — ONDANSETRON 8 MG/1
1 TABLET, FILM COATED ORAL
Qty: 21 | Refills: 0 | OUTPATIENT
Start: 2018-11-23 | End: 2018-11-29

## 2018-11-23 RX ORDER — LOSARTAN POTASSIUM 100 MG/1
1 TABLET, FILM COATED ORAL
Qty: 30 | Refills: 0 | OUTPATIENT
Start: 2018-11-23 | End: 2018-12-22

## 2018-11-23 RX ORDER — ALLOPURINOL 300 MG
400 TABLET ORAL
Qty: 0 | Refills: 0 | COMMUNITY

## 2018-11-23 RX ORDER — SEVELAMER CARBONATE 2400 MG/1
2 POWDER, FOR SUSPENSION ORAL
Qty: 0 | Refills: 0 | COMMUNITY

## 2018-11-23 RX ORDER — METOPROLOL TARTRATE 50 MG
1 TABLET ORAL
Qty: 0 | Refills: 0 | COMMUNITY

## 2018-11-23 RX ORDER — FAMOTIDINE 10 MG/ML
1 INJECTION INTRAVENOUS
Qty: 30 | Refills: 0 | OUTPATIENT
Start: 2018-11-23 | End: 2018-12-22

## 2018-11-23 RX ORDER — ALLOPURINOL 300 MG
1 TABLET ORAL
Qty: 30 | Refills: 0 | OUTPATIENT
Start: 2018-11-23 | End: 2018-12-22

## 2018-11-23 RX ADMIN — SERTRALINE 100 MILLIGRAM(S): 25 TABLET, FILM COATED ORAL at 12:06

## 2018-11-23 RX ADMIN — Medication 0.2 MILLIGRAM(S): at 06:46

## 2018-11-23 RX ADMIN — Medication 100 MILLIGRAM(S): at 12:06

## 2018-11-23 RX ADMIN — Medication 40 MILLIGRAM(S): at 06:46

## 2018-11-23 RX ADMIN — Medication 2000 UNIT(S): at 06:46

## 2018-11-23 RX ADMIN — FAMOTIDINE 20 MILLIGRAM(S): 10 INJECTION INTRAVENOUS at 15:57

## 2018-11-23 RX ADMIN — SEVELAMER CARBONATE 1600 MILLIGRAM(S): 2400 POWDER, FOR SUSPENSION ORAL at 17:20

## 2018-11-23 RX ADMIN — Medication 81 MILLIGRAM(S): at 12:06

## 2018-11-23 RX ADMIN — LOSARTAN POTASSIUM 100 MILLIGRAM(S): 100 TABLET, FILM COATED ORAL at 06:46

## 2018-11-23 RX ADMIN — CALCITRIOL 1 MICROGRAM(S): 0.5 CAPSULE ORAL at 12:06

## 2018-11-23 RX ADMIN — SEVELAMER CARBONATE 1600 MILLIGRAM(S): 2400 POWDER, FOR SUSPENSION ORAL at 09:00

## 2018-11-23 RX ADMIN — Medication 2000 UNIT(S): at 17:20

## 2018-11-23 NOTE — DISCHARGE NOTE ADULT - PLAN OF CARE
Continue with your cholesterol medications. Eat a heart healthy diet that is low in saturated fats and salt, and includes whole grains, fruits, vegetables and lean protein; exercise regularly (consult with your physician or cardiologist first); maintain a heart healthy weight; if you smoke - quit (A resource to help you stop smoking is the [facility] – phone number 985-846-0123.). Continue to follow with your primary physician or cardiologist.  Seek medical help for dizziness, Lightheadedness, Blurry vision, Headache, Chest pain, Shortness of breath Stable You have high blood pressure which puts you at risk for stroke and heart attack.   Take all blood pressure medication as prescribed - Metoprolol, Norvasc, Clonidine, and Losartan.   Follow up with your medical doctor for routine blood pressure monitoring at your next visit.  Notify your doctor if you have any of the following symptoms:   Dizziness, Lightheadedness, Blurry vision, Headache, Chest pain, Shortness of breath You presented to the hospital with complaints of nausea and vomiting which have since resolved.  Your lipase was elevated which is likely due to renal disease. It is unlikely the vomiting was due to pancreatitis as a CT showed no evidence of pancreatitis. Hemodialysis is the most common method used to treat advanced and permanent kidney failure. But even with better procedures and equipment, hemodialysis is still a complicated and inconvenient therapy that requires a coordinated effort from your whole health care team, including your nephrologist, dialysis nurse, dialysis technician, dietitian, and . The most important members of your health care team are you and your family.   Healthy kidneys clean your blood by removing excess fluid, minerals, and wastes. When your kidneys fail, harmful wastes build up in your body, your blood pressure may rise, and your body may retain excess fluid and not make enough red blood cells. When this happens, you need treatment to replace the work of your failed kidneys. In hemodialysis, your blood is allowed to flow, a few ounces at a time, through a special filter that removes wastes and extra fluids. The clean blood is then returned to your body. One of the biggest adjustments you must make when you start hemodialysis treatments is following a strict schedule. Most patients go to a dialysis center three times a week for 3 to 5 or more hours each visit.   Some of the more common conditions caused by kidney failure are extreme tiredness, bone problems, joint problems, itching, and "restless legs”.  Many people treated with hemodialysis complain of itchy skin, which is often worse during or just after treatment.  Patients on dialysis often have insomnia, and some people have a specific problem called the sleep apnea syndrome.  Over time, these sleep disturbances can lead to "day-night reversal" (insomnia at night, sleepiness during the day), headache, depression, and decreased alertness.   Sleep disorders may seem unimportant, but they can impair your quality of life. Don't hesitate to raise these problems with your nurse, doctor, or  A chronic symptom of kidney disease with patients on dialysis is anemia since the kidneys are not functioning to produce the hormone erythropoietin. Continue all medications as prescribed and follow up with your nephrologist for routine blood work monitoring. If you feel dizzy, severely fatigued, experience passing out or lightheadedness, call your primary care provider.

## 2018-11-23 NOTE — DISCHARGE NOTE ADULT - ADDITIONAL INSTRUCTIONS
Follow up with your primary care physician Dr. Howard within 1-2 weeks of discharge.   Follow up with your nephrologist within 1-2 weeks of discharge.

## 2018-11-23 NOTE — DISCHARGE NOTE ADULT - CARE PLAN
Principal Discharge DX:	Hypertensive urgency  Goal:	Stable  Assessment and plan of treatment:	You have high blood pressure which puts you at risk for stroke and heart attack.   Take all blood pressure medication as prescribed - Metoprolol, Norvasc, Clonidine, and Losartan.   Follow up with your medical doctor for routine blood pressure monitoring at your next visit.  Notify your doctor if you have any of the following symptoms:   Dizziness, Lightheadedness, Blurry vision, Headache, Chest pain, Shortness of breath  Secondary Diagnosis:	Non-intractable vomiting with nausea, unspecified vomiting type  Assessment and plan of treatment:	You presented to the hospital with complaints of nausea and vomiting which have since resolved.  Your lipase was elevated which is likely due to renal disease. It is unlikely the vomiting was due to pancreatitis as a CT showed no evidence of pancreatitis.  Secondary Diagnosis:	ESRD (end stage renal disease) on dialysis  Assessment and plan of treatment:	Hemodialysis is the most common method used to treat advanced and permanent kidney failure. But even with better procedures and equipment, hemodialysis is still a complicated and inconvenient therapy that requires a coordinated effort from your whole health care team, including your nephrologist, dialysis nurse, dialysis technician, dietitian, and . The most important members of your health care team are you and your family.   Healthy kidneys clean your blood by removing excess fluid, minerals, and wastes. When your kidneys fail, harmful wastes build up in your body, your blood pressure may rise, and your body may retain excess fluid and not make enough red blood cells. When this happens, you need treatment to replace the work of your failed kidneys. In hemodialysis, your blood is allowed to flow, a few ounces at a time, through a special filter that removes wastes and extra fluids. The clean blood is then returned to your body. One of the biggest adjustments you must make when you start hemodialysis treatments is following a strict schedule. Most patients go to a dialysis center three times a week for 3 to 5 or more hours each visit.   Some of the more common conditions caused by kidney failure are extreme tiredness, bone problems, joint problems, itching, and "restless legs”.  Many people treated with hemodialysis complain of itchy skin, which is often worse during or just after treatment.  Patients on dialysis often have insomnia, and some people have a specific problem called the sleep apnea syndrome.  Over time, these sleep disturbances can lead to "day-night reversal" (insomnia at night, sleepiness during the day), headache, depression, and decreased alertness.   Sleep disorders may seem unimportant, but they can impair your quality of life. Don't hesitate to raise these problems with your nurse, doctor, or   Secondary Diagnosis:	Anemia due to chronic kidney disease  Assessment and plan of treatment:	A chronic symptom of kidney disease with patients on dialysis is anemia since the kidneys are not functioning to produce the hormone erythropoietin. Continue all medications as prescribed and follow up with your nephrologist for routine blood work monitoring. If you feel dizzy, severely fatigued, experience passing out or lightheadedness, call your primary care provider.  Secondary Diagnosis:	HLD (hyperlipidemia)  Assessment and plan of treatment:	Continue with your cholesterol medications. Eat a heart healthy diet that is low in saturated fats and salt, and includes whole grains, fruits, vegetables and lean protein; exercise regularly (consult with your physician or cardiologist first); maintain a heart healthy weight; if you smoke - quit (A resource to help you stop smoking is the [facility] – phone number 842-061-5786.). Continue to follow with your primary physician or cardiologist.  Seek medical help for dizziness, Lightheadedness, Blurry vision, Headache, Chest pain, Shortness of breath

## 2018-11-23 NOTE — DISCHARGE NOTE ADULT - MEDICATION SUMMARY - MEDICATIONS TO STOP TAKING
I will STOP taking the medications listed below when I get home from the hospital:    irbesartan 300 mg oral tablet  -- 1 tab(s) by mouth once a day    acetaminophen-oxyCODONE 325 mg-5 mg oral tablet  -- 1 tab(s) by mouth every 4 hours, As Needed -for severe pain MDD:6  -- Caution federal law prohibits the transfer of this drug to any person other  than the person for whom it was prescribed.  May cause drowsiness.  Alcohol may intensify this effect.  Use care when operating dangerous machinery.  This prescription cannot be refilled.  This product contains acetaminophen.  Do not use  with any other product containing acetaminophen to prevent possible liver damage.  Using more of this medication than prescribed may cause serious breathing problems.

## 2018-11-23 NOTE — DISCHARGE NOTE ADULT - SECONDARY DIAGNOSIS.
HLD (hyperlipidemia) Non-intractable vomiting with nausea, unspecified vomiting type ESRD (end stage renal disease) on dialysis Anemia due to chronic kidney disease

## 2018-11-23 NOTE — DISCHARGE NOTE ADULT - MEDICATION SUMMARY - MEDICATIONS TO CHANGE
I will SWITCH the dose or number of times a day I take the medications listed below when I get home from the hospital:    atorvastatin 20 mg oral tablet  -- 1 tab(s) by mouth once a day (at bedtime)    allopurinol  -- 400 milligram(s) by mouth once a day    Metoprolol Succinate ER 25 mg oral tablet, extended release  -- 1 tab(s) by mouth once a day    cloNIDine 0.1 mg oral tablet  -- 1 tab(s) by mouth 2 times a day    Renvela 800 mg oral tablet  -- 3 tab(s) by mouth 3 times a day (with meals)

## 2018-11-23 NOTE — PROGRESS NOTE ADULT - PROBLEM SELECTOR PLAN 1
BP optimal   for HD today after CT w iv contrast  cont Metoprolol  Losartan, Clonidine and Norvasc    EKG shows LVH tronopin mildly elevated likely secondary to HTN and inability to clear due to ESRD.

## 2018-11-23 NOTE — DISCHARGE NOTE ADULT - CARE PROVIDERS DIRECT ADDRESSES
[email or web address]

## 2018-11-23 NOTE — DISCHARGE NOTE ADULT - CARE PROVIDER_API CALL
Jj Howard), Internal Medicine; Nephrology  [address] [place] [address]  Phone: [phone]  Fax: [phone]

## 2018-11-23 NOTE — DISCHARGE NOTE ADULT - INSTRUCTIONS
No fluid restrictions. Continue renal diet with low sodium, no concentrated potassium or phosphorus. Avoid canned soups, added salt, carbonated beverades, frozen dinners, fried and fatty foods such as french fries, fried chicken, fast food, and bananas. Consult with your nephrologist regarding dietary allowances.

## 2018-11-23 NOTE — DISCHARGE NOTE ADULT - NS AS ACTIVITY OBS
Walking-Outdoors allowed/Bathing allowed/Showering allowed/Walking-Indoors allowed/Resume activity as tolerated./No Heavy lifting/straining

## 2018-11-23 NOTE — DISCHARGE NOTE ADULT - HOSPITAL COURSE
56 y/o F with PMHx ESRD on HD, uncontrolled HTN, gout, HLD, breast CA s/p R lumpectomy p/w NBNB Nausea and vomiting x 1 day since this AM. Pt reports feeling her normal self until this morning when she woke up feeling nauseous and subsequently has sever episodes of nonbilious and nonbloody vomiting. When she presented to her dialysis center, pt was found to be hypertensive and was sent to ED without undergoing HD. Pt denies abdominal pain/ epigastric pain, diarrhea, chest pain, shortness of breath, dizziness, headache, sick contacts, recent travel. Upon admission, pt admitted to consuming 1-2 glasses of vodka per night and CIWA protocol was initiated. Pt has scored consistent 0. Pt received HD on 11/21 and BP medications titrated for a goal of less than 180 systolic with Metoprolol, Losartan, Clonidine, and Norvasc. Continuing sodium bicarbonate but holding phosphate binders in setting of GI distress. A CT A/P with IV contrast was performed to r/o pancreatitis in setting of Lipase elevation, however showed no evidence of pancreatitis. It is likely lipase elevated in setting of ESRD. Pt treated with HD on 11/23/18 and continued to be treated with IV Zofran. Diet was advanced to full regular diet on 11/23, pt tolerating well. Symptoms have since resolved, likely sx attributed to uncontrolled HTN, which has been continually managed.  Pt otherwise stable for discharge from GI and nephrology standpoint.

## 2018-11-23 NOTE — DISCHARGE NOTE ADULT - MEDICATION SUMMARY - MEDICATIONS TO TAKE
I will START or STAY ON the medications listed below when I get home from the hospital:    aspirin 81 mg oral delayed release tablet  -- 1 tab(s) by mouth once a day  -- Indication: For Stroke prevention    losartan 100 mg oral tablet  -- 1 tab(s) by mouth once a day  -- Indication: For Hypertensive urgency    sodium bicarbonate 650 mg oral tablet  -- 2 tab(s) by mouth 2 times a day  -- Indication: For ESRD (end stage renal disease) on dialysis    cloNIDine 0.2 mg oral tablet  -- 1 tab(s) by mouth 3 times a day  -- Indication: For Hypertensive urgency    sertraline 100 mg oral tablet  -- 1 tab(s) by mouth once a day  -- Indication: For Mood disorder    ondansetron 8 mg oral tablet  -- 1 tab(s) by mouth 3 times a day  -- Indication: For Non-intractable vomiting with nausea, unspecified vomiting type    allopurinol 100 mg oral tablet  -- 1 tab(s) by mouth once a day  -- Indication: For Gout, unspecified cause, unspecified chronicity, unspecified site    atorvastatin 40 mg oral tablet  -- 1 tab(s) by mouth once a day  -- Indication: For Mixed hyperlipidemia    metoprolol tartrate 100 mg oral tablet  -- 1 tab(s) by mouth 2 times a day  -- Indication: For Hypertensive urgency    Norvasc 10 mg oral tablet  -- 1 tab(s) by mouth once a day  -- Indication: For Hypertensive urgency    furosemide 40 mg oral tablet  -- 1 tab(s) by mouth once a day  -- Indication: For Diuretic - "water pill"     famotidine 20 mg oral tablet  -- 1 tab(s) by mouth once a day   -- It is very important that you take or use this exactly as directed.  Do not skip doses or discontinue unless directed by your doctor.  Obtain medical advice before taking any non-prescription drugs as some may affect the action of this medication.    -- Indication: For Stomach protection    Renvela 800 mg oral tablet  -- 3 tab(s) by mouth 3 times a day (with meals)  -- Indication: For ESRD (end stage renal disease) on dialysis    calcitriol  -- 1 microgram(s) by mouth once a day  -- Indication: For Vitamin supplementation    Vitamin D3 1000 intl units oral capsule  -- 2 cap(s) by mouth 2 times a day  -- Indication: For Vitamin supplementation

## 2018-11-23 NOTE — DISCHARGE NOTE ADULT - PATIENT PORTAL LINK FT
You can access the [facility] Patient Portal, offered by [facility], by registering with the following website: [email or web address]

## 2018-11-24 VITALS
DIASTOLIC BLOOD PRESSURE: 95 MMHG | SYSTOLIC BLOOD PRESSURE: 152 MMHG | RESPIRATION RATE: 18 BRPM | TEMPERATURE: 98 F | HEART RATE: 66 BPM | OXYGEN SATURATION: 100 %

## 2018-11-24 LAB
ANION GAP SERPL CALC-SCNC: 13 MMOL/L — SIGNIFICANT CHANGE UP (ref 5–17)
BUN SERPL-MCNC: 33 MG/DL — HIGH (ref 7–23)
CALCIUM SERPL-MCNC: 8.2 MG/DL — LOW (ref 8.4–10.5)
CHLORIDE SERPL-SCNC: 97 MMOL/L — SIGNIFICANT CHANGE UP (ref 96–108)
CO2 SERPL-SCNC: 28 MMOL/L — SIGNIFICANT CHANGE UP (ref 22–31)
CREAT SERPL-MCNC: 4.7 MG/DL — HIGH (ref 0.5–1.3)
GLUCOSE SERPL-MCNC: 93 MG/DL — SIGNIFICANT CHANGE UP (ref 70–99)
HAV IGM SER-ACNC: SIGNIFICANT CHANGE UP
HBV CORE AB SER-ACNC: SIGNIFICANT CHANGE UP
HBV CORE IGM SER-ACNC: SIGNIFICANT CHANGE UP
HBV SURFACE AB SER-ACNC: 207 MIU/ML — SIGNIFICANT CHANGE UP
HBV SURFACE AG SER-ACNC: SIGNIFICANT CHANGE UP
HCT VFR BLD CALC: 29.3 % — LOW (ref 34.5–45)
HCV AB S/CO SERPL IA: 0.05 S/CO — SIGNIFICANT CHANGE UP
HCV AB SERPL-IMP: SIGNIFICANT CHANGE UP
HGB BLD-MCNC: 9.2 G/DL — LOW (ref 11.5–15.5)
MCHC RBC-ENTMCNC: 26.7 PG — LOW (ref 27–34)
MCHC RBC-ENTMCNC: 31.4 GM/DL — LOW (ref 32–36)
MCV RBC AUTO: 85.2 FL — SIGNIFICANT CHANGE UP (ref 80–100)
PLATELET # BLD AUTO: 156 K/UL — SIGNIFICANT CHANGE UP (ref 150–400)
POTASSIUM SERPL-MCNC: 3.8 MMOL/L — SIGNIFICANT CHANGE UP (ref 3.5–5.3)
POTASSIUM SERPL-SCNC: 3.8 MMOL/L — SIGNIFICANT CHANGE UP (ref 3.5–5.3)
RBC # BLD: 3.44 M/UL — LOW (ref 3.8–5.2)
RBC # FLD: 19.3 % — HIGH (ref 10.3–14.5)
SODIUM SERPL-SCNC: 138 MMOL/L — SIGNIFICANT CHANGE UP (ref 135–145)
WBC # BLD: 4.9 K/UL — SIGNIFICANT CHANGE UP (ref 3.8–10.5)
WBC # FLD AUTO: 4.9 K/UL — SIGNIFICANT CHANGE UP (ref 3.8–10.5)

## 2018-11-24 PROCEDURE — 86709 HEPATITIS A IGM ANTIBODY: CPT

## 2018-11-24 PROCEDURE — 86706 HEP B SURFACE ANTIBODY: CPT

## 2018-11-24 PROCEDURE — 87340 HEPATITIS B SURFACE AG IA: CPT

## 2018-11-24 PROCEDURE — 85014 HEMATOCRIT: CPT

## 2018-11-24 PROCEDURE — 85610 PROTHROMBIN TIME: CPT

## 2018-11-24 PROCEDURE — 83735 ASSAY OF MAGNESIUM: CPT

## 2018-11-24 PROCEDURE — 83605 ASSAY OF LACTIC ACID: CPT

## 2018-11-24 PROCEDURE — 96375 TX/PRO/DX INJ NEW DRUG ADDON: CPT

## 2018-11-24 PROCEDURE — 83690 ASSAY OF LIPASE: CPT

## 2018-11-24 PROCEDURE — 84484 ASSAY OF TROPONIN QUANT: CPT

## 2018-11-24 PROCEDURE — 82787 IGG 1 2 3 OR 4 EACH: CPT

## 2018-11-24 PROCEDURE — 84295 ASSAY OF SERUM SODIUM: CPT

## 2018-11-24 PROCEDURE — 85027 COMPLETE CBC AUTOMATED: CPT

## 2018-11-24 PROCEDURE — 74018 RADEX ABDOMEN 1 VIEW: CPT

## 2018-11-24 PROCEDURE — 99285 EMERGENCY DEPT VISIT HI MDM: CPT | Mod: 25

## 2018-11-24 PROCEDURE — 80048 BASIC METABOLIC PNL TOTAL CA: CPT

## 2018-11-24 PROCEDURE — 84132 ASSAY OF SERUM POTASSIUM: CPT

## 2018-11-24 PROCEDURE — 84100 ASSAY OF PHOSPHORUS: CPT

## 2018-11-24 PROCEDURE — 93005 ELECTROCARDIOGRAM TRACING: CPT

## 2018-11-24 PROCEDURE — 86803 HEPATITIS C AB TEST: CPT

## 2018-11-24 PROCEDURE — 80053 COMPREHEN METABOLIC PANEL: CPT

## 2018-11-24 PROCEDURE — 71045 X-RAY EXAM CHEST 1 VIEW: CPT

## 2018-11-24 PROCEDURE — 82330 ASSAY OF CALCIUM: CPT

## 2018-11-24 PROCEDURE — 99261: CPT

## 2018-11-24 PROCEDURE — 85730 THROMBOPLASTIN TIME PARTIAL: CPT

## 2018-11-24 PROCEDURE — 80061 LIPID PANEL: CPT

## 2018-11-24 PROCEDURE — 86705 HEP B CORE ANTIBODY IGM: CPT

## 2018-11-24 PROCEDURE — 82947 ASSAY GLUCOSE BLOOD QUANT: CPT

## 2018-11-24 PROCEDURE — 82565 ASSAY OF CREATININE: CPT

## 2018-11-24 PROCEDURE — 74177 CT ABD & PELVIS W/CONTRAST: CPT

## 2018-11-24 PROCEDURE — 86704 HEP B CORE ANTIBODY TOTAL: CPT

## 2018-11-24 PROCEDURE — 96365 THER/PROPH/DIAG IV INF INIT: CPT

## 2018-11-24 PROCEDURE — 82435 ASSAY OF BLOOD CHLORIDE: CPT

## 2018-11-24 PROCEDURE — 82803 BLOOD GASES ANY COMBINATION: CPT

## 2018-11-24 RX ADMIN — Medication 100 MILLIGRAM(S): at 10:28

## 2018-11-24 RX ADMIN — Medication 0.2 MILLIGRAM(S): at 06:12

## 2018-11-24 RX ADMIN — CALCITRIOL 1 MICROGRAM(S): 0.5 CAPSULE ORAL at 10:26

## 2018-11-24 RX ADMIN — Medication 100 MILLIGRAM(S): at 01:28

## 2018-11-24 RX ADMIN — Medication 40 MILLIGRAM(S): at 06:12

## 2018-11-24 RX ADMIN — AMLODIPINE BESYLATE 10 MILLIGRAM(S): 2.5 TABLET ORAL at 10:29

## 2018-11-24 RX ADMIN — Medication 100 MILLIGRAM(S): at 10:30

## 2018-11-24 RX ADMIN — ATORVASTATIN CALCIUM 40 MILLIGRAM(S): 80 TABLET, FILM COATED ORAL at 01:27

## 2018-11-24 RX ADMIN — Medication 0.2 MILLIGRAM(S): at 01:28

## 2018-11-24 RX ADMIN — SERTRALINE 100 MILLIGRAM(S): 25 TABLET, FILM COATED ORAL at 10:33

## 2018-11-24 RX ADMIN — Medication 2000 UNIT(S): at 06:12

## 2018-11-24 RX ADMIN — Medication 81 MILLIGRAM(S): at 10:29

## 2018-11-24 RX ADMIN — LOSARTAN POTASSIUM 100 MILLIGRAM(S): 100 TABLET, FILM COATED ORAL at 06:12

## 2018-11-24 RX ADMIN — SEVELAMER CARBONATE 1600 MILLIGRAM(S): 2400 POWDER, FOR SUSPENSION ORAL at 08:27

## 2018-11-24 NOTE — PROVIDER CONTACT NOTE (OTHER) - ASSESSMENT
VSS, no complaints at this time
pt AO4 s/p dialysis  pt asymptomatic, no chest pain/ palpitations/discomfort  Due for PM doses of lopressor and clonidine, medications given   pt missed all BP meds today (Amlodipine, Lopressor, Clonidine)

## 2018-11-24 NOTE — CHART NOTE - NSCHARTNOTEFT_GEN_A_CORE
Discharge Note:    Requested by Dr. Daugherty to facilitate d/c home. V/s, labs, diagnostics reviewed, pt stable to d/c now. Medication reconciliation reviewed, revised, and resolved with Dr. Daugherty who medically cleared w/ f/u as directed. Please refer to d/c note for hospital details.    Juan A Skaggs  Spectra-link [id number]

## 2018-11-24 NOTE — PROVIDER CONTACT NOTE (OTHER) - ACTION/TREATMENT ORDERED:
Hold Bp meds monitor BP
As per PA, recheck BP in 1-2 hours after medication administration   Will continue to monitor patient.

## 2018-11-24 NOTE — PROGRESS NOTE ADULT - SUBJECTIVE AND OBJECTIVE BOX
No abdominal pain.  Tolerating PO.  HTN urgency post HD yesterday.  Benign pancreatic imaging.      PAST MEDICAL & SURGICAL HISTORY:  HLD (hyperlipidemia)  Anemia due to chronic kidney disease  Spinal stenosis  Herniated lumbar intervertebral disc  Chronic kidney disease (CKD)  Gout  Depression  Breast CA, right  Hypertension  Renal insufficiency  S/P lumpectomy, right breast      MEDICATIONS  (STANDING):  allopurinol 100 milliGRAM(s) Oral daily  amLODIPine   Tablet 10 milliGRAM(s) Oral daily  aspirin enteric coated 81 milliGRAM(s) Oral daily  atorvastatin 40 milliGRAM(s) Oral at bedtime  calcitriol   Capsule 1 MICROGram(s) Oral daily  cholecalciferol 2000 Unit(s) Oral two times a day  cloNIDine 0.2 milliGRAM(s) Oral three times a day  famotidine Injectable 20 milliGRAM(s) IV Push daily  furosemide    Tablet 40 milliGRAM(s) Oral daily  losartan 100 milliGRAM(s) Oral daily  metoprolol tartrate 100 milliGRAM(s) Oral two times a day  sertraline 100 milliGRAM(s) Oral daily  sevelamer hydrochloride 1600 milliGRAM(s) Oral three times a day with meals  sodium bicarbonate 1300 milliGRAM(s) Oral daily    MEDICATIONS  (PRN):  metoclopramide Injectable 5 milliGRAM(s) IV Push every 8 hours PRN Nausea and vomiting  ondansetron Injectable 4 milliGRAM(s) IV Push every 8 hours PRN Nausea and/or Vomiting      Allergies    penicillin (Unknown)    Intolerances        Review of Systems:    General:  see HPI  CV:  No pain, palpitations, hypo/hypertension  Resp:  No dyspnea, cough, tachypnea, wheezing  GI:  see HPI  :  No pain, bleeding, incontinence, nocturia  Muscle:  No pain, weakness  Neuro:  No weakness, tingling, memory problems  Psych:  No fatigue, insomnia, mood problems, depression  Endocrine:  No polyuria, polydypsia, cold/heat intolerance  Heme:  No petechiae, ecchymosis, easy bruisability  Skin:  No rash, tattoos, scars, edema      Vital Signs Last 24 Hrs  T(C): 36.6 (24 Nov 2018 04:14), Max: 37 (24 Nov 2018 00:40)  T(F): 97.9 (24 Nov 2018 04:14), Max: 98.6 (24 Nov 2018 00:40)  HR: 65 (24 Nov 2018 04:14) (65 - 86)  BP: 123/74 (24 Nov 2018 04:14) (123/74 - 187/111)  BP(mean): --  RR: 18 (24 Nov 2018 04:14) (18 - 18)  SpO2: 95% (24 Nov 2018 04:14) (95% - 98%)    PHYSICAL EXAM:    Constitutional: NAD, well-developed  Neck: No LAD, supple  Respiratory: CTA and P  Cardiovascular: S1 and S2, RRR, no M  Gastrointestinal: BS+, soft, NT/ND, neg HSM,  Extremities: No peripheral edema, neg clubbing, cyanosis  Vascular: 2+ peripheral pulses  Neurological: A/O x 3, no focal deficits  Psychiatric: Normal mood, normal affect  Skin: No rashes      LABS:                        10.6   8.27  )-----------( 147      ( 22 Nov 2018 08:24 )             34.0     11-24    138  |  97  |  33<H>  ----------------------------<  93  3.8   |  28  |  4.70<H>    Ca    8.2<L>      24 Nov 2018 06:06  Phos  4.5     11-23  Mg     2.2     11-23        < from: CT Abdomen and Pelvis w/ IV Cont (11.23.18 @ 13:56) >  LIVER: Liver is enlarged, measuring 18.2 cm in craniocaudal diameter.   There is dilatation of the intrahepatic IVC and hepatic veins.  BILE DUCTS: Normal caliber.  GALLBLADDER: Nonspecific trace pericholecystic fluid. No gallbladder wall   thickening or calculi. The gallbladder is not distended.  SPLEEN: Within normal limits.  PANCREAS: Within normal limits. No peripancreatic stranding or fluid   collection. No pancreatic necrosis. Pancreatic duct is normal in caliber.  ADRENALS: Within normal limits.  KIDNEYS/URETERS: Multiple bilateral renal cysts. Both kidneys are small   in size. No renal calculus or hydronephrosis.    BLADDER: Within normal limits.  REPRODUCTIVE ORGANS: Myomatous uterus.    BOWEL: No bowel obstruction. Appendix is normal.  PERITONEUM: No ascites.  VESSELS:  Within normal limits.  RETROPERITONEUM: No lymphadenopathy.    ABDOMINAL WALL: Within normal limits.  BONES: Degenerative changes.    IMPRESSION: No CT evidence of pancreatitis.    < end of copied text >
Clinically improved today.  No abdominal pain.  Tolerating PO diet.      PAST MEDICAL & SURGICAL HISTORY:  HLD (hyperlipidemia)  Anemia due to chronic kidney disease  Spinal stenosis  Herniated lumbar intervertebral disc  Chronic kidney disease (CKD)  Gout  Depression  Breast CA, right  Hypertension  Renal insufficiency  S/P lumpectomy, right breast      MEDICATIONS  (STANDING):  allopurinol 100 milliGRAM(s) Oral daily  amLODIPine   Tablet 10 milliGRAM(s) Oral daily  aspirin enteric coated 81 milliGRAM(s) Oral daily  atorvastatin 40 milliGRAM(s) Oral at bedtime  calcitriol   Capsule 1 MICROGram(s) Oral daily  cholecalciferol 2000 Unit(s) Oral two times a day  cloNIDine 0.2 milliGRAM(s) Oral three times a day  famotidine Injectable 20 milliGRAM(s) IV Push daily  furosemide    Tablet 40 milliGRAM(s) Oral daily  losartan 100 milliGRAM(s) Oral daily  metoprolol tartrate 100 milliGRAM(s) Oral two times a day  sertraline 100 milliGRAM(s) Oral daily  sevelamer hydrochloride 1600 milliGRAM(s) Oral three times a day with meals  sodium bicarbonate 1300 milliGRAM(s) Oral daily    MEDICATIONS  (PRN):  metoclopramide Injectable 5 milliGRAM(s) IV Push every 8 hours PRN Nausea and vomiting  ondansetron Injectable 4 milliGRAM(s) IV Push every 8 hours PRN Nausea and/or Vomiting      Allergies    penicillin (Unknown)    Intolerances        Review of Systems:    General:  see HPI  CV:  No pain, palpitations, hypo/hypertension  Resp:  No dyspnea, cough, tachypnea, wheezing  GI:  see HPI  :  No pain, bleeding, incontinence, nocturia  Muscle:  No pain, weakness  Neuro:  No weakness, tingling, memory problems  Psych:  No fatigue, insomnia, mood problems, depression  Endocrine:  No polyuria, polydypsia, cold/heat intolerance  Heme:  No petechiae, ecchymosis, easy bruisability  Skin:  No rash, tattoos, scars, edema      Vital Signs Last 24 Hrs  T(C): 36.6 (23 Nov 2018 11:54), Max: 36.8 (22 Nov 2018 20:38)  T(F): 97.9 (23 Nov 2018 11:54), Max: 98.3 (22 Nov 2018 20:38)  HR: 73 (23 Nov 2018 11:54) (73 - 87)  BP: 133/84 (23 Nov 2018 11:54) (116/78 - 154/92)  BP(mean): --  RR: 18 (23 Nov 2018 11:54) (18 - 18)  SpO2: 97% (23 Nov 2018 11:54) (97% - 100%)    PHYSICAL EXAM:    Constitutional: NAD, well-developed  Neck: No LAD, supple  Respiratory: CTA and P  Cardiovascular: S1 and S2, RRR, no M  Gastrointestinal: BS+, soft, NT/ND, neg HSM,  Extremities: No peripheral edema, neg clubbing, cyanosis  Vascular: 2+ peripheral pulses  Neurological: A/O x 3, no focal deficits  Psychiatric: Normal mood, normal affect  Skin: No rashes        LABS:                        10.6   8.27  )-----------( 147      ( 22 Nov 2018 08:24 )             34.0     11-22    136  |  92<L>  |  34<H>  ----------------------------<  103<H>  3.9   |  25  |  5.11<H>    Ca    8.7      22 Nov 2018 07:35  Phos  4.5     11-23  Mg     2.2     11-23    TPro  8.0  /  Alb  4.6  /  TBili  1.0  /  DBili  x   /  AST  53<H>  /  ALT  29  /  AlkPhos  307<H>  11-22    LIVER FUNCTIONS - ( 22 Nov 2018 07:35 )  Alb: 4.6 g/dL / Pro: 8.0 g/dL / ALK PHOS: 307 U/L / ALT: 29 U/L / AST: 53 U/L / GGT: x           PT/INR - ( 21 Nov 2018 13:09 )   PT: 12.3 sec;   INR: 1.08 ratio         PTT - ( 21 Nov 2018 13:09 )  PTT:29.8 sec                    RADIOLOGY & ADDITIONAL TESTS:
Patient is a 57y old  Female who presents with a chief complaint of HTN (21 Nov 2018 20:22)      INTERVAL HPI/OVERNIGHT EVENTS: feels well, abd pain resolved, able to tolerate full liquid diet well      Vital Signs Last 24 Hrs  T(C): 37.1 (22 Nov 2018 04:24), Max: 37.1 (22 Nov 2018 04:24)  T(F): 98.8 (22 Nov 2018 04:24), Max: 98.8 (22 Nov 2018 04:24)  HR: 85 (22 Nov 2018 04:24) (85 - 118)  BP: 149/85 (22 Nov 2018 04:24) (149/85 - 220/150)  BP(mean): --  RR: 17 (22 Nov 2018 04:24) (17 - 20)  SpO2: 98% (22 Nov 2018 04:24) (95% - 100%)    allopurinol 100 milliGRAM(s) Oral daily  amLODIPine   Tablet 10 milliGRAM(s) Oral daily  aspirin enteric coated 81 milliGRAM(s) Oral daily  atorvastatin 40 milliGRAM(s) Oral at bedtime  calcitriol   Capsule 1 MICROGram(s) Oral daily  cholecalciferol 2000 Unit(s) Oral two times a day  cloNIDine 0.2 milliGRAM(s) Oral three times a day  famotidine Injectable 20 milliGRAM(s) IV Push daily  furosemide    Tablet 40 milliGRAM(s) Oral daily  losartan 100 milliGRAM(s) Oral daily  metoclopramide Injectable 5 milliGRAM(s) IV Push every 8 hours PRN  metoprolol tartrate 100 milliGRAM(s) Oral two times a day  ondansetron Injectable 4 milliGRAM(s) IV Push every 8 hours PRN  sertraline 100 milliGRAM(s) Oral daily  sevelamer hydrochloride 1600 milliGRAM(s) Oral three times a day with meals  sodium bicarbonate 1300 milliGRAM(s) Oral daily      PHYSICAL EXAM:  GENERAL: NAD,   EYES: conjunctiva and sclera clear  ENMT: Moist mucous membranes  NECK: Supple, No JVD, Normal thyroid  NERVOUS SYSTEM:  Alert & Oriented X3,   CHEST/LUNG: Clear to auscultation bilaterally; No rales, rhonchi, wheezing, or rubs  HEART: Regular rate and rhythm; No murmurs, rubs, or gallops  ABDOMEN: Soft, Nontender, Nondistended; Bowel sounds present  EXTREMITIES:  2+ Peripheral Pulses, No clubbing, cyanosis, or edema  LYMPH: No lymphadenopathy noted  SKIN: No rashes or lesions    Consultant(s) Notes Reviewed:  [x ] YES  [ ] NO  Care Discussed with Consultants/Other Providers [ x] YES  [ ] NO    LABS:                        10.6   8.27  )-----------( 147      ( 22 Nov 2018 08:24 )             34.0     11-22    136  |  92<L>  |  34<H>  ----------------------------<  103<H>  3.9   |  25  |  5.11<H>    Ca    8.7      22 Nov 2018 07:35  Phos  4.4     11-22  Mg     2.2     11-22    TPro  8.0  /  Alb  4.6  /  TBili  1.0  /  DBili  x   /  AST  53<H>  /  ALT  29  /  AlkPhos  307<H>  11-22    PT/INR - ( 21 Nov 2018 13:09 )   PT: 12.3 sec;   INR: 1.08 ratio         PTT - ( 21 Nov 2018 13:09 )  PTT:29.8 sec    CAPILLARY BLOOD GLUCOSE                RADIOLOGY & ADDITIONAL TESTS:    Imaging Personally Reviewed:  [x ] YES  [ ] NO
Patient is a 57y old  Female who presents with a chief complaint of HTN (23 Nov 2018 11:57)      INTERVAL HPI/OVERNIGHT EVENTS: noted, feels well        Vital Signs Last 24 Hrs  T(C): 36.6 (23 Nov 2018 11:54), Max: 36.8 (22 Nov 2018 20:38)  T(F): 97.9 (23 Nov 2018 11:54), Max: 98.3 (22 Nov 2018 20:38)  HR: 82 (23 Nov 2018 14:49) (73 - 87)  BP: 147/86 (23 Nov 2018 14:49) (116/78 - 154/92)  BP(mean): --  RR: 18 (23 Nov 2018 11:54) (18 - 18)  SpO2: 97% (23 Nov 2018 11:54) (97% - 100%)    allopurinol 100 milliGRAM(s) Oral daily  amLODIPine   Tablet 10 milliGRAM(s) Oral daily  aspirin enteric coated 81 milliGRAM(s) Oral daily  atorvastatin 40 milliGRAM(s) Oral at bedtime  calcitriol   Capsule 1 MICROGram(s) Oral daily  cholecalciferol 2000 Unit(s) Oral two times a day  cloNIDine 0.2 milliGRAM(s) Oral three times a day  famotidine Injectable 20 milliGRAM(s) IV Push daily  furosemide    Tablet 40 milliGRAM(s) Oral daily  losartan 100 milliGRAM(s) Oral daily  metoclopramide Injectable 5 milliGRAM(s) IV Push every 8 hours PRN  metoprolol tartrate 100 milliGRAM(s) Oral two times a day  ondansetron Injectable 4 milliGRAM(s) IV Push every 8 hours PRN  sertraline 100 milliGRAM(s) Oral daily  sevelamer hydrochloride 1600 milliGRAM(s) Oral three times a day with meals      PHYSICAL EXAM:  GENERAL: NAD,   EYES: conjunctiva and sclera clear  ENMT: Moist mucous membranes  NECK: Supple, No JVD, Normal thyroid  NERVOUS SYSTEM:  Alert & Oriented X3,   CHEST/LUNG: Clear to auscultation bilaterally; No rales, rhonchi, wheezing, or rubs  HEART: Regular rate and rhythm; No murmurs, rubs, or gallops  ABDOMEN: Soft, Nontender, Nondistended; Bowel sounds present  EXTREMITIES:  2+ Peripheral Pulses, No clubbing, cyanosis, or edema  LYMPH: No lymphadenopathy noted  SKIN: No rashes or lesions    Consultant(s) Notes Reviewed:  [x ] YES  [ ] NO  Care Discussed with Consultants/Other Providers [ x] YES  [ ] NO    LABS:                        10.6   8.27  )-----------( 147      ( 22 Nov 2018 08:24 )             34.0     11-22    136  |  92<L>  |  34<H>  ----------------------------<  103<H>  3.9   |  25  |  5.11<H>    Ca    8.7      22 Nov 2018 07:35  Phos  4.5     11-23  Mg     2.2     11-23    TPro  8.0  /  Alb  4.6  /  TBili  1.0  /  DBili  x   /  AST  53<H>  /  ALT  29  /  AlkPhos  307<H>  11-22        CAPILLARY BLOOD GLUCOSE                RADIOLOGY & ADDITIONAL TESTS:    Imaging Personally Reviewed:  [x ] YES  [ ] NO
[facility]   [phone]  DIALYSIS NOTE  Chief Complaint: ESRD/Ongoing hemodialysis requirement.    24 hour events/subjective:    usual day for hd TTS  seen s/p ct scan abd with iv contrast   feels better overall no new c/o         ALLERGIES & MEDICATIONS  --------------------------------------------------------------------------------  Allergies    penicillin (Unknown)    Intolerances      Standing Inpatient Medications  allopurinol 100 milliGRAM(s) Oral daily  amLODIPine   Tablet 10 milliGRAM(s) Oral daily  aspirin enteric coated 81 milliGRAM(s) Oral daily  atorvastatin 40 milliGRAM(s) Oral at bedtime  calcitriol   Capsule 1 MICROGram(s) Oral daily  cholecalciferol 2000 Unit(s) Oral two times a day  cloNIDine 0.2 milliGRAM(s) Oral three times a day  famotidine Injectable 20 milliGRAM(s) IV Push daily  furosemide    Tablet 40 milliGRAM(s) Oral daily  losartan 100 milliGRAM(s) Oral daily  metoprolol tartrate 100 milliGRAM(s) Oral two times a day  sertraline 100 milliGRAM(s) Oral daily  sevelamer hydrochloride 1600 milliGRAM(s) Oral three times a day with meals    PRN Inpatient Medications  metoclopramide Injectable 5 milliGRAM(s) IV Push every 8 hours PRN  ondansetron Injectable 4 milliGRAM(s) IV Push every 8 hours PRN      REVIEW OF SYSTEMS  --------------------------------------------------------------------------------  no itching or rash  no fever or chill  no cp or palp   no sob or cough   no N/V/D/ no abd pain   ext no edema         VITALS/PHYSICAL EXAM  --------------------------------------------------------------------------------  T(C): 36.6 (11-23-18 @ 11:54), Max: 36.8 (11-22-18 @ 20:38)  HR: 83 (11-23-18 @ 19:24) (73 - 87)  BP: 148/86 (11-23-18 @ 19:24) (116/78 - 154/92)  RR: 18 (11-23-18 @ 11:54) (18 - 18)  SpO2: 97% (11-23-18 @ 11:54) (97% - 100%)  Wt(kg): --    Weight (kg): 69.5 (11-21-18 @ 21:58)      11-22-18 @ 07:01  -  11-23-18 @ 07:00  --------------------------------------------------------  IN: 960 mL / OUT: 0 mL / NET: 960 mL    11-23-18 @ 07:01  -  11-23-18 @ 20:04  --------------------------------------------------------  IN: 720 mL / OUT: 0 mL / NET: 720 mL      Physical Exam:  		    	Gen: alert oriented place person and date   	Pulm: Decreased breath sounds b/l bases no rales or ronchi  	CV: RRR, S1/S2  	Abd: +BS, soft, nontender/nondistended  	Extremity: No cyanosis, no edema no clubbing    	    LABS/STUDIES  --------------------------------------------------------------------------------              10.6   8.27  >-----------<  147      [11-22-18 @ 08:24]              34.0     136  |  92  |  34  ----------------------------<  103      [11-22-18 @ 07:35]  3.9   |  25  |  5.11        Ca     8.7     [11-22-18 @ 07:35]      Mg     2.2     [11-23-18 @ 09:18]      Phos  4.5     [11-23-18 @ 09:18]    TPro  8.0  /  Alb  4.6  /  TBili  1.0  /  DBili  x   /  AST  53  /  ALT  29  /  AlkPhos  307  [11-22-18 @ 07:35]

## 2018-11-24 NOTE — PROGRESS NOTE ADULT - ASSESSMENT
57F ESRD 2/2 HTN on HD, presents with uncontrolled HTN, N/V/Abd pain with elevated lipase over 500.    Lipase may be elevated in the setting of ESRD and symptoms may be secondary to uncontrolled HTN . Outpatient US 5/2018 without gallstones.    CT Abd w contrast without radiologic imaging c/w pancreatitis.  Enlarged liver ?worse ETOH use than described.  Normal triglycerides. IgG4 sent.  Diet as tolerated.  Patient on several medications with drug induced pancreatitis risk (furosemide, losartan, atorvastatin). If patient is consistent avoiding ETOH and has recurrent episode, would address medications above.    To follow with Dr Vieira as GI outpatient.  [phone]
56 yo female PMHx HTN, CKD on HD T,Th,Sat normally (MWF this week d/t holiday), gout, HLD, breast CA presents to ED c/o nausea
57 yr old F w/a hx of ESRD, uncontrolled HTN, gout, HLD and s/p r lumpectomy presents with nausea and vomiting., pancreatitis and hyperkalemia    1- esrd      hd   revaclear 300  3 hr   1 liter   bfr 400 cc  dfr 600 cc  2 k bath  d/w pt out pt hd unit they will hd pt in am
57F ESRD 2/2 HTN on HD, presents with uncontrolled HTN, N/V/Abd pain with elevated lipase over 500.    Lipase may be elevated in the setting of ESRD and symptoms may be secondary to uncontrolled HTN . Outpatient US 5/2018 without gallstones.  Patient reports ETOH use "a few drinks per week". If accurate, unlikely to cause pancreatitis.  CT Abd w contrast. (D/w Dr Story)  Triglycerides, IgG4 sent.  Diet as tolerated.  If all negative patient on several medications with drug induced pancreatitis risk (furosemide, losartan, atorvastatin).  Clinically improved. No GI contraindication to D/C if no emergent findings on CT.    To follow with Dr Vieira as GI outpatient.  [phone]
58 yo female PMHx HTN, CKD on HD T,Th,Sat normally (MWF this week d/t holiday), gout, HLD, breast CA presents to ED c/o nausea

## 2018-11-27 LAB
IGG SERPL-MCNC: 1010 MG/DL — SIGNIFICANT CHANGE UP (ref 700–1600)
IGG1 SER-MCNC: 584 MG/DL — SIGNIFICANT CHANGE UP (ref 248–810)
IGG2 SER-MCNC: 234 MG/DL — SIGNIFICANT CHANGE UP (ref 130–555)
IGG3 SER-MCNC: 72 MG/DL — SIGNIFICANT CHANGE UP (ref 15–102)
IGG4 SER-MCNC: 37 MG/DL — SIGNIFICANT CHANGE UP (ref 2–96)

## 2019-01-03 ENCOUNTER — INPATIENT (INPATIENT)
Facility: HOSPITAL | Age: 59
LOS: 0 days | Discharge: HOME | End: 2019-01-04
Attending: HOSPITALIST | Admitting: HOSPITALIST
Payer: COMMERCIAL

## 2019-01-03 VITALS
SYSTOLIC BLOOD PRESSURE: 198 MMHG | OXYGEN SATURATION: 93 % | RESPIRATION RATE: 17 BRPM | TEMPERATURE: 98 F | HEART RATE: 116 BPM | DIASTOLIC BLOOD PRESSURE: 160 MMHG

## 2019-01-03 DIAGNOSIS — N18.9 CHRONIC KIDNEY DISEASE, UNSPECIFIED: ICD-10-CM

## 2019-01-03 DIAGNOSIS — Z29.9 ENCOUNTER FOR PROPHYLACTIC MEASURES, UNSPECIFIED: ICD-10-CM

## 2019-01-03 DIAGNOSIS — R11.2 NAUSEA WITH VOMITING, UNSPECIFIED: ICD-10-CM

## 2019-01-03 DIAGNOSIS — I16.0 HYPERTENSIVE URGENCY: ICD-10-CM

## 2019-01-03 DIAGNOSIS — I10 ESSENTIAL (PRIMARY) HYPERTENSION: ICD-10-CM

## 2019-01-03 DIAGNOSIS — F39 UNSPECIFIED MOOD [AFFECTIVE] DISORDER: ICD-10-CM

## 2019-01-03 DIAGNOSIS — N18.6 END STAGE RENAL DISEASE: ICD-10-CM

## 2019-01-03 DIAGNOSIS — R11.10 VOMITING, UNSPECIFIED: ICD-10-CM

## 2019-01-03 LAB
ALBUMIN SERPL ELPH-MCNC: 4.8 G/DL — SIGNIFICANT CHANGE UP (ref 3.3–5)
ALP SERPL-CCNC: 298 U/L — HIGH (ref 40–120)
ALT FLD-CCNC: 10 U/L — SIGNIFICANT CHANGE UP (ref 4–33)
APTT BLD: 32.8 SEC — SIGNIFICANT CHANGE UP (ref 27.5–36.3)
AST SERPL-CCNC: 16 U/L — SIGNIFICANT CHANGE UP (ref 4–32)
B PERT DNA SPEC QL NAA+PROBE: NOT DETECTED — SIGNIFICANT CHANGE UP
BASE EXCESS BLDV CALC-SCNC: 3.1 MMOL/L — SIGNIFICANT CHANGE UP
BASOPHILS # BLD AUTO: 0.01 K/UL — SIGNIFICANT CHANGE UP (ref 0–0.2)
BASOPHILS NFR BLD AUTO: 0.1 % — SIGNIFICANT CHANGE UP (ref 0–2)
BILIRUB SERPL-MCNC: 0.9 MG/DL — SIGNIFICANT CHANGE UP (ref 0.2–1.2)
BLOOD GAS VENOUS - CREATININE: 9.9 MG/DL — HIGH (ref 0.5–1.3)
BUN SERPL-MCNC: 48 MG/DL — HIGH (ref 7–23)
C PNEUM DNA SPEC QL NAA+PROBE: NOT DETECTED — SIGNIFICANT CHANGE UP
CALCIUM SERPL-MCNC: 9.1 MG/DL — SIGNIFICANT CHANGE UP (ref 8.4–10.5)
CHLORIDE BLDV-SCNC: 99 MMOL/L — SIGNIFICANT CHANGE UP (ref 96–108)
CHLORIDE SERPL-SCNC: 94 MMOL/L — LOW (ref 98–107)
CO2 SERPL-SCNC: 24 MMOL/L — SIGNIFICANT CHANGE UP (ref 22–31)
CREAT SERPL-MCNC: 9.33 MG/DL — HIGH (ref 0.5–1.3)
EOSINOPHIL # BLD AUTO: 0.01 K/UL — SIGNIFICANT CHANGE UP (ref 0–0.5)
EOSINOPHIL NFR BLD AUTO: 0.1 % — SIGNIFICANT CHANGE UP (ref 0–6)
FLUAV H1 2009 PAND RNA SPEC QL NAA+PROBE: NOT DETECTED — SIGNIFICANT CHANGE UP
FLUAV H1 RNA SPEC QL NAA+PROBE: NOT DETECTED — SIGNIFICANT CHANGE UP
FLUAV H3 RNA SPEC QL NAA+PROBE: NOT DETECTED — SIGNIFICANT CHANGE UP
FLUAV SUBTYP SPEC NAA+PROBE: NOT DETECTED — SIGNIFICANT CHANGE UP
FLUBV RNA SPEC QL NAA+PROBE: NOT DETECTED — SIGNIFICANT CHANGE UP
GAS PNL BLDV: 137 MMOL/L — SIGNIFICANT CHANGE UP (ref 136–146)
GLUCOSE BLDV-MCNC: 133 — HIGH (ref 70–99)
GLUCOSE SERPL-MCNC: 137 MG/DL — HIGH (ref 70–99)
HADV DNA SPEC QL NAA+PROBE: NOT DETECTED — SIGNIFICANT CHANGE UP
HCO3 BLDV-SCNC: 26 MMOL/L — SIGNIFICANT CHANGE UP (ref 20–27)
HCOV PNL SPEC NAA+PROBE: SIGNIFICANT CHANGE UP
HCT VFR BLD CALC: 35.4 % — SIGNIFICANT CHANGE UP (ref 34.5–45)
HCT VFR BLDV CALC: 34 % — LOW (ref 34.5–45)
HGB BLD-MCNC: 11 G/DL — LOW (ref 11.5–15.5)
HGB BLDV-MCNC: 11 G/DL — LOW (ref 11.5–15.5)
HMPV RNA SPEC QL NAA+PROBE: NOT DETECTED — SIGNIFICANT CHANGE UP
HPIV1 RNA SPEC QL NAA+PROBE: NOT DETECTED — SIGNIFICANT CHANGE UP
HPIV2 RNA SPEC QL NAA+PROBE: NOT DETECTED — SIGNIFICANT CHANGE UP
HPIV3 RNA SPEC QL NAA+PROBE: NOT DETECTED — SIGNIFICANT CHANGE UP
HPIV4 RNA SPEC QL NAA+PROBE: NOT DETECTED — SIGNIFICANT CHANGE UP
IMM GRANULOCYTES NFR BLD AUTO: 0.6 % — SIGNIFICANT CHANGE UP (ref 0–1.5)
INR BLD: 1.09 — SIGNIFICANT CHANGE UP (ref 0.88–1.17)
LACTATE BLDV-MCNC: 2.9 MMOL/L — HIGH (ref 0.5–2)
LIDOCAIN IGE QN: 114.8 U/L — HIGH (ref 7–60)
LYMPHOCYTES # BLD AUTO: 0.62 K/UL — LOW (ref 1–3.3)
LYMPHOCYTES # BLD AUTO: 8.6 % — LOW (ref 13–44)
MAGNESIUM SERPL-MCNC: 2.4 MG/DL — SIGNIFICANT CHANGE UP (ref 1.6–2.6)
MCHC RBC-ENTMCNC: 27.3 PG — SIGNIFICANT CHANGE UP (ref 27–34)
MCHC RBC-ENTMCNC: 31.1 % — LOW (ref 32–36)
MCV RBC AUTO: 87.8 FL — SIGNIFICANT CHANGE UP (ref 80–100)
MONOCYTES # BLD AUTO: 0.52 K/UL — SIGNIFICANT CHANGE UP (ref 0–0.9)
MONOCYTES NFR BLD AUTO: 7.2 % — SIGNIFICANT CHANGE UP (ref 2–14)
NEUTROPHILS # BLD AUTO: 5.98 K/UL — SIGNIFICANT CHANGE UP (ref 1.8–7.4)
NEUTROPHILS NFR BLD AUTO: 83.4 % — HIGH (ref 43–77)
NRBC # FLD: 0 — SIGNIFICANT CHANGE UP
PCO2 BLDV: 48 MMHG — SIGNIFICANT CHANGE UP (ref 41–51)
PH BLDV: 7.38 PH — SIGNIFICANT CHANGE UP (ref 7.32–7.43)
PHOSPHATE SERPL-MCNC: 5.7 MG/DL — HIGH (ref 2.5–4.5)
PLATELET # BLD AUTO: 162 K/UL — SIGNIFICANT CHANGE UP (ref 150–400)
PMV BLD: 8.5 FL — SIGNIFICANT CHANGE UP (ref 7–13)
PO2 BLDV: 24 MMHG — LOW (ref 35–40)
POTASSIUM BLDV-SCNC: 4.4 MMOL/L — SIGNIFICANT CHANGE UP (ref 3.4–4.5)
POTASSIUM SERPL-MCNC: 4.7 MMOL/L — SIGNIFICANT CHANGE UP (ref 3.5–5.3)
POTASSIUM SERPL-SCNC: 4.7 MMOL/L — SIGNIFICANT CHANGE UP (ref 3.5–5.3)
PROT SERPL-MCNC: 9 G/DL — HIGH (ref 6–8.3)
PROTHROM AB SERPL-ACNC: 12.1 SEC — SIGNIFICANT CHANGE UP (ref 9.8–13.1)
RBC # BLD: 4.03 M/UL — SIGNIFICANT CHANGE UP (ref 3.8–5.2)
RBC # FLD: 17.7 % — HIGH (ref 10.3–14.5)
RSV RNA SPEC QL NAA+PROBE: NOT DETECTED — SIGNIFICANT CHANGE UP
RV+EV RNA SPEC QL NAA+PROBE: NOT DETECTED — SIGNIFICANT CHANGE UP
SAO2 % BLDV: 28.9 % — LOW (ref 60–85)
SODIUM SERPL-SCNC: 140 MMOL/L — SIGNIFICANT CHANGE UP (ref 135–145)
WBC # BLD: 7.18 K/UL — SIGNIFICANT CHANGE UP (ref 3.8–10.5)
WBC # FLD AUTO: 7.18 K/UL — SIGNIFICANT CHANGE UP (ref 3.8–10.5)

## 2019-01-03 PROCEDURE — 99223 1ST HOSP IP/OBS HIGH 75: CPT

## 2019-01-03 PROCEDURE — 71045 X-RAY EXAM CHEST 1 VIEW: CPT | Mod: 26

## 2019-01-03 RX ORDER — SEVELAMER CARBONATE 2400 MG/1
3 POWDER, FOR SUSPENSION ORAL
Qty: 0 | Refills: 0 | COMMUNITY

## 2019-01-03 RX ORDER — LOSARTAN POTASSIUM 100 MG/1
100 TABLET, FILM COATED ORAL DAILY
Qty: 0 | Refills: 0 | Status: DISCONTINUED | OUTPATIENT
Start: 2019-01-03 | End: 2019-01-04

## 2019-01-03 RX ORDER — METOPROLOL TARTRATE 50 MG
100 TABLET ORAL DAILY
Qty: 0 | Refills: 0 | Status: DISCONTINUED | OUTPATIENT
Start: 2019-01-03 | End: 2019-01-04

## 2019-01-03 RX ORDER — CHOLECALCIFEROL (VITAMIN D3) 125 MCG
1000 CAPSULE ORAL
Qty: 0 | Refills: 0 | Status: DISCONTINUED | OUTPATIENT
Start: 2019-01-03 | End: 2019-01-04

## 2019-01-03 RX ORDER — ONDANSETRON 8 MG/1
4 TABLET, FILM COATED ORAL EVERY 4 HOURS
Qty: 0 | Refills: 0 | Status: DISCONTINUED | OUTPATIENT
Start: 2019-01-03 | End: 2019-01-04

## 2019-01-03 RX ORDER — HYDRALAZINE HCL 50 MG
5 TABLET ORAL ONCE
Qty: 0 | Refills: 0 | Status: COMPLETED | OUTPATIENT
Start: 2019-01-03 | End: 2019-01-03

## 2019-01-03 RX ORDER — ONDANSETRON 8 MG/1
4 TABLET, FILM COATED ORAL ONCE
Qty: 0 | Refills: 0 | Status: COMPLETED | OUTPATIENT
Start: 2019-01-03 | End: 2019-01-03

## 2019-01-03 RX ORDER — FUROSEMIDE 40 MG
40 TABLET ORAL DAILY
Qty: 0 | Refills: 0 | Status: DISCONTINUED | OUTPATIENT
Start: 2019-01-03 | End: 2019-01-04

## 2019-01-03 RX ORDER — METOPROLOL TARTRATE 50 MG
1 TABLET ORAL
Qty: 0 | Refills: 0 | COMMUNITY

## 2019-01-03 RX ORDER — AMLODIPINE BESYLATE 2.5 MG/1
10 TABLET ORAL DAILY
Qty: 0 | Refills: 0 | Status: DISCONTINUED | OUTPATIENT
Start: 2019-01-03 | End: 2019-01-04

## 2019-01-03 RX ORDER — SODIUM BICARBONATE 1 MEQ/ML
650 SYRINGE (ML) INTRAVENOUS
Qty: 0 | Refills: 0 | Status: DISCONTINUED | OUTPATIENT
Start: 2019-01-03 | End: 2019-01-04

## 2019-01-03 RX ORDER — ATORVASTATIN CALCIUM 80 MG/1
40 TABLET, FILM COATED ORAL AT BEDTIME
Qty: 0 | Refills: 0 | Status: DISCONTINUED | OUTPATIENT
Start: 2019-01-03 | End: 2019-01-04

## 2019-01-03 RX ORDER — METOCLOPRAMIDE HCL 10 MG
10 TABLET ORAL ONCE
Qty: 0 | Refills: 0 | Status: COMPLETED | OUTPATIENT
Start: 2019-01-03 | End: 2019-01-03

## 2019-01-03 RX ORDER — SERTRALINE 25 MG/1
100 TABLET, FILM COATED ORAL DAILY
Qty: 0 | Refills: 0 | Status: DISCONTINUED | OUTPATIENT
Start: 2019-01-03 | End: 2019-01-04

## 2019-01-03 RX ADMIN — AMLODIPINE BESYLATE 10 MILLIGRAM(S): 2.5 TABLET ORAL at 18:16

## 2019-01-03 RX ADMIN — Medication 0.2 MILLIGRAM(S): at 16:15

## 2019-01-03 RX ADMIN — Medication 0.2 MILLIGRAM(S): at 23:17

## 2019-01-03 RX ADMIN — ONDANSETRON 4 MILLIGRAM(S): 8 TABLET, FILM COATED ORAL at 16:14

## 2019-01-03 RX ADMIN — LOSARTAN POTASSIUM 100 MILLIGRAM(S): 100 TABLET, FILM COATED ORAL at 18:15

## 2019-01-03 RX ADMIN — Medication 100 MILLIGRAM(S): at 18:15

## 2019-01-03 RX ADMIN — ONDANSETRON 4 MILLIGRAM(S): 8 TABLET, FILM COATED ORAL at 23:05

## 2019-01-03 RX ADMIN — Medication 1000 UNIT(S): at 19:16

## 2019-01-03 RX ADMIN — Medication 5 MILLIGRAM(S): at 23:51

## 2019-01-03 RX ADMIN — ONDANSETRON 4 MILLIGRAM(S): 8 TABLET, FILM COATED ORAL at 13:31

## 2019-01-03 RX ADMIN — Medication 40 MILLIGRAM(S): at 18:16

## 2019-01-03 RX ADMIN — Medication 10 MILLIGRAM(S): at 14:29

## 2019-01-03 NOTE — ED ADULT NURSE NOTE - OBJECTIVE STATEMENT
Cristy RN: 58 y female A+Ox3 presents to the ER with the complaint abdominal pain and vomiting since 5pm yesterday. Pt is currently on dialysis (tues/thurs/sat), got full dialysis on tuesday, but missed today due to the vomiting and abdominal pain. Vomiting started without any trigger and pt states she has had this issue before.  Vomiting 3-5x since yesterday but denies any diarrhea or constipation. Pt was to follow up with a gastroenterologist next Thursday for a possible endoscopy but symptoms darvin again prompting her to come to the ER. Pt states she has been unable to tolerate anything PO. Left av fistula noted on forearm, 20 g iv placed on right ac, labs drawn and sent. Endorsed report to primary RN Darvin.

## 2019-01-03 NOTE — PROVIDER CONTACT NOTE (OTHER) - RECOMMENDATIONS
BP was rechecked, To give PRN Zofran and wait for a while then give Clonidine 0.2 mg as per ADS Shahla

## 2019-01-03 NOTE — H&P ADULT - PROBLEM SELECTOR PLAN 2
Patient with cough and vomiting over last 2 days. Associated with rhinorrhea. Boyfriend at home has "bronchitis". CXR shows pulmonary congestion, but this is likely a result of missed HD. She is afebrile and has no leukocytosis. Suspect viral URI and post-tussive emesis.    -f/u RVP  -Robitussin-DM prn cough  -Zofran prn nausea

## 2019-01-03 NOTE — CONSULT NOTE ADULT - SUBJECTIVE AND OBJECTIVE BOX
[facility] DIVISION OF KIDNEY DISEASES AND HYPERTENSION -- [phone]  -- INITIAL CONSULT NOTE  --------------------------------------------------------------------------------  HPI: 57 yo female with medical history of ESRD on HD (TTS) admitted with SOB. Nephrology consulted for ESRD management. Pt came complaining of non productive cough, nasal congestion, anorexia and nausea, vomiting. Last outpatient HD 12/31/18.  Primary nephrologist Dr Story. CXR showed pulmonary congestion. Denies fever/chills, CP, SOB, palp.    Pt was seen and examined at bedside    PAST HISTORY  --------------------------------------------------------------------------------  PAST MEDICAL & SURGICAL HISTORY:  HLD (hyperlipidemia)  Anemia due to chronic kidney disease  Spinal stenosis  Herniated lumbar intervertebral disc  Chronic kidney disease (CKD)  Gout  Depression  Breast CA, right  Hypertension  Renal insufficiency  S/P lumpectomy, right breast    FAMILY HISTORY:  No pertinent family history in first degree relatives    PAST SOCIAL HISTORY:  Social ETOH    ALLERGIES & MEDICATIONS  --------------------------------------------------------------------------------  Allergies    penicillin (Unknown)    Intolerances      Standing Inpatient Medications  cloNIDine 0.2 milliGRAM(s) Oral once  ondansetron Injectable 4 milliGRAM(s) IV Push once    PRN Inpatient Medications      REVIEW OF SYSTEMS  --------------------------------------------------------------------------------  Gen: No fevers/chills  Skin: No rashes  Head/Eyes/Ears: Normal hearing,   Respiratory: + dyspnea, cough  CV: No chest pain  GI: No abdominal pain, diarrhea +nausea, vomiting  : No dysuria, hematuria  MSK: No  edema  Heme: No easy bruising or bleeding  Psych: No significant depression    All other systems were reviewed and are negative, except as noted.    VITALS/PHYSICAL EXAM  --------------------------------------------------------------------------------  T(C): 36.8 (01-03-19 @ 12:38), Max: 36.8 (01-03-19 @ 12:38)  HR: 117 (01-03-19 @ 15:36) (116 - 117)  BP: 225/143 (01-03-19 @ 15:36) (198/160 - 225/143)  RR: 16 (01-03-19 @ 15:36) (16 - 17)  SpO2: 100% (01-03-19 @ 15:36) (93% - 100%)  Wt(kg): --        Physical Exam:  	Gen: NAD  	HEENT: MMM  	Pulm: CTA B/L  	CV: S1S2  	Abd: Soft, +BS   	Ext: No LE edema B/L  	Neuro: Awake  	Skin: Warm and dry  	Vascular access:    LABS/STUDIES  --------------------------------------------------------------------------------              11.0   7.18  >-----------<  162      [01-03-19 @ 13:20]              35.4     140  |  94  |  48  ----------------------------<  137      [01-03-19 @ 13:20]  4.7   |  24  |  9.33        Ca     9.1     [01-03-19 @ 13:20]      Mg     2.4     [01-03-19 @ 13:20]      Phos  5.7     [01-03-19 @ 13:20]    TPro  9.0  /  Alb  4.8  /  TBili  0.9  /  DBili  x   /  AST  16  /  ALT  10  /  AlkPhos  298  [01-03-19 @ 13:20]    PT/INR: PT 12.1 , INR 1.09       [01-03-19 @ 13:20]  PTT: 32.8       [01-03-19 @ 13:20]      Creatinine Trend:  SCr 9.33 [01-03 @ 13:20]    Urinalysis - [02-17-16 @ 17:36]      Color Yellow / Appearance Slightly Turbid / SG 1.013 / pH 6.0      Gluc Negative / Ketone Negative  / Bili Negative / Urobili Negative       Blood Negative / Protein 300 / Leuk Est Negative / Nitrite Negative      RBC 1 / WBC 6 / Hyaline 2 / Gran  / Sq Epi  / Non Sq Epi 25 / Bacteria Occasional      HbA1c 5.3      [02-17-16 @ 17:41]  Lipid: chol 126, TG 72, HDL 66, LDL 46      [11-23-18 @ 12:40]    HBsAb 207.0      [11-24-18 @ 08:30]  HBsAb Nonreact      [02-17-16 @ 17:36]  HBsAg Nonreact      [11-24-18 @ 08:30]  HBcAb Nonreact      [11-24-18 @ 08:30]  HCV 0.05, Nonreact      [11-24-18 @ 08:30]  HIV Nonreact      [02-17-16 @ 17:41] [facility] DIVISION OF KIDNEY DISEASES AND HYPERTENSION -- [phone]  -- INITIAL CONSULT NOTE  --------------------------------------------------------------------------------  HPI: 59 yo female with medical history of ESRD on HD (TTS) admitted with SOB. Nephrology consulted for ESRD management. Pt came complaining of non productive cough, nasal congestion, anorexia that started 1 week ago and nausea, vomiting that started 1 day ago. Last outpatient HD 12/31/18. Pt receives HD at [facility] [place]. Primary nephrologist Dr Waters. Treatment for 3.25 hrs. DW 72 kg.   CXR showed pulmonary congestion. Denies fever/chills, CP, SOB, palp.     Pt was seen and examined at bedside, reports     PAST HISTORY  --------------------------------------------------------------------------------  PAST MEDICAL & SURGICAL HISTORY:  HLD (hyperlipidemia)  Anemia due to chronic kidney disease  Spinal stenosis  Herniated lumbar intervertebral disc  Chronic kidney disease (CKD)  Gout  Depression  Breast CA, right  Hypertension  Renal insufficiency  S/P lumpectomy, right breast    FAMILY HISTORY:  No pertinent family history in first degree relatives    PAST SOCIAL HISTORY:  Social ETOH    ALLERGIES & MEDICATIONS  --------------------------------------------------------------------------------  Allergies    penicillin (Unknown)    Intolerances      Standing Inpatient Medications  cloNIDine 0.2 milliGRAM(s) Oral once  ondansetron Injectable 4 milliGRAM(s) IV Push once    PRN Inpatient Medications      REVIEW OF SYSTEMS  --------------------------------------------------------------------------------  Gen: No fevers/chills  Skin: No rashes  Head/Eyes/Ears: Normal hearing,   Respiratory: + dyspnea, cough  CV: No chest pain  GI: No abdominal pain, diarrhea +nausea, vomiting  : No dysuria, hematuria  MSK: No  edema  Heme: No easy bruising or bleeding  Psych: No significant depression    All other systems were reviewed and are negative, except as noted.    VITALS/PHYSICAL EXAM  --------------------------------------------------------------------------------  T(C): 36.8 (01-03-19 @ 12:38), Max: 36.8 (01-03-19 @ 12:38)  HR: 117 (01-03-19 @ 15:36) (116 - 117)  BP: 225/143 (01-03-19 @ 15:36) (198/160 - 225/143)  RR: 16 (01-03-19 @ 15:36) (16 - 17)  SpO2: 100% (01-03-19 @ 15:36) (93% - 100%)  Wt(kg): --        Physical Exam:  	Gen: NAD  	HEENT: MMM  	Pulm: CTA B/L  	CV: S1S2  	Abd: Soft, +BS   	Ext: No LE edema B/L  	Neuro: Awake  	Skin: Warm and dry  	Vascular access: LUE AVF +thrill. + bruit    LABS/STUDIES  --------------------------------------------------------------------------------              11.0   7.18  >-----------<  162      [01-03-19 @ 13:20]              35.4     140  |  94  |  48  ----------------------------<  137      [01-03-19 @ 13:20]  4.7   |  24  |  9.33        Ca     9.1     [01-03-19 @ 13:20]      Mg     2.4     [01-03-19 @ 13:20]      Phos  5.7     [01-03-19 @ 13:20]    TPro  9.0  /  Alb  4.8  /  TBili  0.9  /  DBili  x   /  AST  16  /  ALT  10  /  AlkPhos  298  [01-03-19 @ 13:20]    PT/INR: PT 12.1 , INR 1.09       [01-03-19 @ 13:20]  PTT: 32.8       [01-03-19 @ 13:20]      Creatinine Trend:  SCr 9.33 [01-03 @ 13:20]    Urinalysis - [02-17-16 @ 17:36]      Color Yellow / Appearance Slightly Turbid / SG 1.013 / pH 6.0      Gluc Negative / Ketone Negative  / Bili Negative / Urobili Negative       Blood Negative / Protein 300 / Leuk Est Negative / Nitrite Negative      RBC 1 / WBC 6 / Hyaline 2 / Gran  / Sq Epi  / Non Sq Epi 25 / Bacteria Occasional      HbA1c 5.3      [02-17-16 @ 17:41]  Lipid: chol 126, TG 72, HDL 66, LDL 46      [11-23-18 @ 12:40]    HBsAb 207.0      [11-24-18 @ 08:30]  HBsAb Nonreact      [02-17-16 @ 17:36]  HBsAg Nonreact      [11-24-18 @ 08:30]  HBcAb Nonreact      [11-24-18 @ 08:30]  HCV 0.05, Nonreact      [11-24-18 @ 08:30]  HIV Nonreact      [02-17-16 @ 17:41]

## 2019-01-03 NOTE — ED ADULT NURSE NOTE - NSIMPLEMENTINTERV_GEN_ALL_ED
Implemented All Universal Safety Interventions:  [place] to call system. Call bell, personal items and telephone within reach. Instruct patient to call for assistance. Room bathroom lighting operational. Non-slip footwear when patient is off stretcher. Physically safe environment: no spills, clutter or unnecessary equipment. Stretcher in lowest position, wheels locked, appropriate side rails in place.

## 2019-01-03 NOTE — ED PROVIDER NOTE - PHYSICAL EXAMINATION
Gen: NAD, non-toxic, conversational  Eyes: PERRLA, EOMI   HENT: Normocephalic, atraumatic. External ears normal, no rhinorrhea, moist mucous membranes.   CV: RRR, 3/6 murmur LUSB, palpable fistula with thrill left wrist  Resp: CTAB, non-labored, speaking without difficulty on room air  Abd: soft, non tender, non rigid, no guarding or rebound tenderness  Back: No CVAT bilaterally, no midline ttp  Skin: dry, wwp   Neuro: AOx3, speech is fluent and appropriate  Psych: Mood concerned, affect euthymic Gen: NAD, non-toxic, conversational  Eyes: PERRLA, EOMI   HENT: Normocephalic, atraumatic. External ears normal, no rhinorrhea, moist mucous membranes.   CV: RRR, 3/6 murmur LUSB, palpable fistula with thrill left wrist  Resp: CTAB, non-labored, speaking without difficulty on room air  Abd: soft, non tender, non rigid, no guarding or rebound tenderness  Back: No CVAT bilaterally, no midline ttp  Skin: dry, wwp   Neuro: AOx3, speech is fluent and appropriate  Psych: Mood concerned, affect euthymic    Attending/Marko: NAD; PERRL/EOMI, non-icterus, +dry mucosa, supple, no RASHEED, no JVD, RRR, CTAB; Abd-soft, +epigastric PT, no rebound no HSM; no LE edema, LUE-AVF +thrill, A&Ox3, nonfocal; Skin-warm/dry

## 2019-01-03 NOTE — ED PROVIDER NOTE - MEDICAL DECISION MAKING DETAILS
58F ESRD on dialysis presenting with cough congestion x2 days, loss of appetite, nausea and vomiting x1 day, missed dialysis today to come here, will check labs, ekg, cxr, rvp, admit.

## 2019-01-03 NOTE — CONSULT NOTE ADULT - PROBLEM SELECTOR RECOMMENDATION 9
Pt. with ESRD on HD TIW (TTS). Last HD as outpatient was on 12/31/18 via . Pt. clinically stable. Serum potassium WNL today. Will arrange for maintenance HD today. Monitor labs.

## 2019-01-03 NOTE — CONSULT NOTE ADULT - PROBLEM SELECTOR RECOMMENDATION 2
BP elevated. Restart home meds, will do HD with UF. Monitor BP on current BP medication. Low salt diet.

## 2019-01-03 NOTE — H&P ADULT - PROBLEM SELECTOR PLAN 1
BP 220s/140s at present in setting of missed medications due to vomiting. Patient states she is able to hold down pills now and tolerated a tablet of clonidine.    Will resume home dose of clonidine, amlodipine, losartan.     Patient also tachycardic to 120s in setting of missed metoprolol dose due to vomiting. Will resume metoprolol and obtain ECG.

## 2019-01-03 NOTE — CONSULT NOTE ADULT - ASSESSMENT
57 yo female with medical history of ESRD on HD (TTS) admitted with SOB. Nephrology consulted for ESRD management.

## 2019-01-03 NOTE — H&P ADULT - ASSESSMENT
58 W ESRD on HD, essential HTN presents with 2-day c/o vomiting, runny nose, cough. Afebrile, hypertensive, tachycardic, O2 sat 100% RA. On exam, she is well-appearing and in NAD. Lungs are clear. Legs are warm and without edema. She is AOx3. Labs without leukocytosis. K 4.7, CO2 24, phos 5.7. CXR with pulmonary congestion.

## 2019-01-03 NOTE — H&P ADULT - HISTORY OF PRESENT ILLNESS
HPI:    58 W ESRD on HD, essential HTN presents with 2-day c/o vomiting, runny nose, cough. Patient states she has had a cough since yesterday productive of clear, non-bloody sputum. Also with runny nose. Patient started vomiting yesterday after coughing fits. Has not been able to swallow medications since this morning. States she was given a pill in the ED after which she did not vomit. No abdominal pain or diarrhea. States her boyfriend at home has "bronchitis". Last HD 2 days ago. No SOB or leg swelling. No chest pain.    PAST MEDICAL & SURGICAL HISTORY:  HLD (hyperlipidemia)  Anemia due to chronic kidney disease  Spinal stenosis  Herniated lumbar intervertebral disc  Chronic kidney disease (CKD)  Gout  Depression  Breast CA, right  Hypertension  Renal insufficiency  S/P lumpectomy, right breast      Review of Systems:   CONSTITUTIONAL: No fever, weight loss, or fatigue  EYES: No eye pain, visual disturbances, or discharge  ENMT:  No difficulty hearing, tinnitus, vertigo; No sinus or throat pain  NECK: No pain or stiffness  BREASTS: No pain, masses, or nipple discharge  RESPIRATORY: +cough, no wheezing, no chills or hemoptysis; No shortness of breath  CARDIOVASCULAR: No chest pain, palpitations, dizziness, or leg swelling  GASTROINTESTINAL: No abdominal or epigastric pain. +nausea, +vomiting, no hematemesis; No diarrhea or constipation. No melena or hematochezia.  GENITOURINARY: No dysuria, frequency, hematuria, or incontinence  NEUROLOGICAL: No headaches, memory loss, loss of strength, numbness, or tremors  SKIN: No itching, burning, rashes, or lesions   LYMPH NODES: No enlarged glands  ENDOCRINE: No heat or cold intolerance; No hair loss  MUSCULOSKELETAL: No joint pain or swelling; No muscle, back, or extremity pain  PSYCHIATRIC: No depression, anxiety, mood swings, or difficulty sleeping  HEME/LYMPH: No easy bruising, or bleeding gums  ALLERGY AND IMMUNOLOGIC: No hives or eczema    Allergies    penicillin (Unknown)    Intolerances        Social History:   Lives with boyfriend. Does not smoke, drink, use drugs.    FAMILY HISTORY:  No pertinent family history in first degree relatives      MEDICATIONS  (STANDING):  amLODIPine   Tablet 10 milliGRAM(s) Oral daily  atorvastatin 40 milliGRAM(s) Oral at bedtime  cholecalciferol 1000 Unit(s) Oral two times a day  cloNIDine 0.2 milliGRAM(s) Oral once  cloNIDine 0.2 milliGRAM(s) Oral three times a day  furosemide    Tablet 40 milliGRAM(s) Oral daily  losartan 100 milliGRAM(s) Oral daily  metoprolol succinate  milliGRAM(s) Oral daily  ondansetron Injectable 4 milliGRAM(s) IV Push once  sertraline 100 milliGRAM(s) Oral daily  sodium bicarbonate 650 milliGRAM(s) Oral two times a day    MEDICATIONS  (PRN):  ondansetron Injectable 4 milliGRAM(s) IV Push every 4 hours PRN Nausea and/or Vomiting      T(C): 36.8 (01-03-19 @ 12:38), Max: 36.8 (01-03-19 @ 12:38)  HR: 117 (01-03-19 @ 15:36) (116 - 117)  BP: 225/143 (01-03-19 @ 15:36) (198/160 - 225/143)  RR: 16 (01-03-19 @ 15:36) (16 - 17)  SpO2: 100% (01-03-19 @ 15:36) (93% - 100%)    CAPILLARY BLOOD GLUCOSE        I&O's Summary      PHYSICAL EXAM:  GENERAL: NAD, well-developed, seated in bed, pleasant  HEAD:  Atraumatic, Normocephalic, MMM  EYES: EOMI, PERRLA, conjunctiva and sclera clear  NECK: Supple, No elevated JVD  CHEST/LUNG: Clear to auscultation bilaterally; No wheeze  HEART: Regular rate and rhythm; No murmurs, rubs, or gallops  ABDOMEN: Soft, Nontender, Nondistended; Bowel sounds present  EXTREMITIES:  2+ Peripheral Pulses, No clubbing, cyanosis, or edema; L forearm AVF with thrill and bruit  PSYCH: AAOx3  NEUROLOGY: CN II-XII grossly intact, moving all extremities  SKIN: No rashes or lesions    LABS:                        11.0   7.18  )-----------( 162      ( 03 Jan 2019 13:20 )             35.4     01-03    140  |  94<L>  |  48<H>  ----------------------------<  137<H>  4.7   |  24  |  9.33<H>    Ca    9.1      03 Jan 2019 13:20  Phos  5.7     01-03  Mg     2.4     01-03    TPro  9.0<H>  /  Alb  4.8  /  TBili  0.9  /  DBili  x   /  AST  16  /  ALT  10  /  AlkPhos  298<H>  01-03    PT/INR - ( 03 Jan 2019 13:20 )   PT: 12.1 SEC;   INR: 1.09          PTT - ( 03 Jan 2019 13:20 )  PTT:32.8 SEC            RADIOLOGY & ADDITIONAL TESTS:    ECG Personally Reviewed -     Imaging Personally Reviewed: CXR - bilateral pulmonary congestion    Consultant(s) Notes Reviewed:  Nephrology    Care Discussed with Consultants/Other Providers:

## 2019-01-03 NOTE — ED ADULT TRIAGE NOTE - CHIEF COMPLAINT QUOTE
pt c/o vomiting, abd pain and headache x 1 day. states she missed her dialysis today d/2 vomiting. a/v fistula noted to R arm. actively vomiting in triage and hypertensive.

## 2019-01-03 NOTE — ED PROVIDER NOTE - OBJECTIVE STATEMENT
ESRD on dialysis presenting for evaluation of cough + congestion x2 days, now with nausea and vomiting, is supposed to have dialysis today but came here instead. Does not feel short of breath, has not been eating or drinking well for the past day. Her boyfriend has bronchitis at home, no other ill contacts. Denies other sx of concern at this time. ESRD on dialysis presenting for evaluation of cough + congestion x2 days, now with nausea and vomiting, is supposed to have dialysis today but came here instead. Does not feel short of breath, has not been eating or drinking well for the past day. Her boyfriend has bronchitis at home, no other ill contacts. Denies other sx of concern at this time.    Attending/Marko: 59 yo F as described above h/o ESRD on HD Tu/Th/Sat (last HD on Tu) p/w several days of URI symptoms of cough, nasal congestion, anorexia and since 0500 today n/v (nonbloody). Denies fever/chills, CP, SOB, palp.

## 2019-01-04 ENCOUNTER — TRANSCRIPTION ENCOUNTER (OUTPATIENT)
Age: 59
End: 2019-01-04

## 2019-01-04 VITALS
DIASTOLIC BLOOD PRESSURE: 90 MMHG | TEMPERATURE: 99 F | HEART RATE: 90 BPM | SYSTOLIC BLOOD PRESSURE: 135 MMHG | RESPIRATION RATE: 17 BRPM | OXYGEN SATURATION: 98 %

## 2019-01-04 PROCEDURE — 99223 1ST HOSP IP/OBS HIGH 75: CPT | Mod: GC

## 2019-01-04 RX ORDER — SODIUM BICARBONATE 1 MEQ/ML
1 SYRINGE (ML) INTRAVENOUS
Qty: 0 | Refills: 0 | COMMUNITY
Start: 2019-01-04

## 2019-01-04 RX ORDER — SODIUM BICARBONATE 1 MEQ/ML
2 SYRINGE (ML) INTRAVENOUS
Qty: 0 | Refills: 0 | COMMUNITY
Start: 2019-01-04

## 2019-01-04 RX ORDER — CALCITRIOL 0.5 UG/1
1 CAPSULE ORAL
Qty: 0 | Refills: 0 | COMMUNITY

## 2019-01-04 RX ORDER — METOPROLOL TARTRATE 50 MG
5 TABLET ORAL ONCE
Qty: 0 | Refills: 0 | Status: DISCONTINUED | OUTPATIENT
Start: 2019-01-04 | End: 2019-01-04

## 2019-01-04 RX ADMIN — Medication 40 MILLIGRAM(S): at 05:38

## 2019-01-04 RX ADMIN — Medication 0.2 MILLIGRAM(S): at 13:06

## 2019-01-04 RX ADMIN — LOSARTAN POTASSIUM 100 MILLIGRAM(S): 100 TABLET, FILM COATED ORAL at 05:38

## 2019-01-04 RX ADMIN — Medication 650 MILLIGRAM(S): at 05:39

## 2019-01-04 RX ADMIN — Medication 100 MILLIGRAM(S): at 05:38

## 2019-01-04 RX ADMIN — SERTRALINE 100 MILLIGRAM(S): 25 TABLET, FILM COATED ORAL at 13:06

## 2019-01-04 RX ADMIN — AMLODIPINE BESYLATE 10 MILLIGRAM(S): 2.5 TABLET ORAL at 05:39

## 2019-01-04 RX ADMIN — Medication 1000 UNIT(S): at 05:38

## 2019-01-04 RX ADMIN — Medication 0.2 MILLIGRAM(S): at 05:39

## 2019-01-04 NOTE — PROGRESS NOTE ADULT - PROBLEM SELECTOR PLAN 2
Patient with cough and vomiting over last 2 days. Associated with rhinorrhea.   Domestic partner" at home has "bronchitis". CXR shows pulmonary congestion, but this is likely a result of missed HD. She is afebrile and has no leukocytosis. Suspect viral URI and post-tussive emesis.  Feels much better. RVP neg.  tolerating diet. Robitussin-DM prn cough  -Zofran prn nausea  - CXR no PNA Patient with cough and vomiting over last 2 days. Associated with rhinorrhea.   Domestic partner" at home has "bronchitis". CXR shows pulmonary congestion, but this is likely a result of missed HD. She is afebrile and has no leukocytosis. Suspect viral URI and post-tussive emesis vs. food poisoning. resolved.   Feels much better. RVP neg.  tolerating diet. Robitussin-DM prn cough  -Zofran prn nausea  - CXR no PNA.  - mild Lipase elevation of 114, nonspecific. in the setting of CKD. Better than previous value of over 500 a few months ago. Denied EtOH abuse recently.

## 2019-01-04 NOTE — PROGRESS NOTE ADULT - PROBLEM SELECTOR PLAN 1
BP 220s/140s at present in setting of missed medications due to vomiting.   she missed two doses of Clonidine at least. N/V started after eating [employer] Beef at [employer].   s/p dialysis yesterday. BP much better this am. systolic 160.   continue home clonidine, amlodipine, losartan.     Patient also tachycardic to 120s in setting of missed metoprolol dose due to vomiting. Will resume metoprolol and obtain ECG. BP 220s/140s at present in setting of missed medications due to vomiting.   she missed two doses of Clonidine at least. N/V started after eating Roast Beef at [employer].   s/p dialysis yesterday. BP much better this am. systolic 160.   continue home clonidine, amlodipine, losartan.   Patient also tachycardic to 120s in setting of missed metoprolol dose due to vomiting. Will resume metoprolol. HR better today.

## 2019-01-04 NOTE — PROGRESS NOTE ADULT - SUBJECTIVE AND OBJECTIVE BOX
Patient is a 58y old  Female who presents with a chief complaint of Vomiting (03 Jan 2019 16:49)      SUBJECTIVE / OVERNIGHT EVENTS:  Pt seen and examined at bedside.   No overnight event. Feeling better.  no cp, no sob, no nausea. resolved.   no abd pain. ate breakfast this am.  no BM. no diarrhea.   BP better. systolic 160 is her baseline per pt.   states she missed two doses of Clonidine at home.       Vital Signs Last 24 Hrs  T(C): 37.2 (04 Jan 2019 05:33), Max: 37.2 (04 Jan 2019 05:33)  T(F): 99 (04 Jan 2019 05:33), Max: 99 (04 Jan 2019 05:33)  HR: 95 (04 Jan 2019 05:33) (95 - 117)  BP: 160/75 (04 Jan 2019 05:33) (156/107 - 226/145)  BP(mean): --  RR: 18 (04 Jan 2019 05:33) (16 - 20)  SpO2: 100% (04 Jan 2019 05:33) (93% - 100%)  I&O's Summary    03 Jan 2019 07:01  -  04 Jan 2019 07:00  --------------------------------------------------------  IN: 600 mL / OUT: 2600 mL / NET: -2000 mL        PHYSICAL EXAM:  GENERAL: NAD, Comfortable  HEAD:  Atraumatic, Normocephalic  EYES: EOMI, PERRLA, conjunctiva and sclera clear  NECK: Supple, No JVD  CHEST/LUNG: Clear to auscultation bilaterally; No wheeze  HEART: Regular rate and rhythm; No murmurs, rubs, or gallops  ABDOMEN: Soft, Nontender, Nondistended; Bowel sounds present  Neuro: AAO x 3, no focal deficit, 5/5 b/l extremities  EXTREMITIES:  2+ Peripheral Pulses, No clubbing, cyanosis, or edema  SKIN: No rashes or lesions    LABS:                        11.0   7.18  )-----------( 162      ( 03 Jan 2019 13:20 )             35.4     01-03    140  |  94<L>  |  48<H>  ----------------------------<  137<H>  4.7   |  24  |  9.33<H>    Ca    9.1      03 Jan 2019 13:20  Phos  5.7     01-03  Mg     2.4     01-03    TPro  9.0<H>  /  Alb  4.8  /  TBili  0.9  /  DBili  x   /  AST  16  /  ALT  10  /  AlkPhos  298<H>  01-03    PT/INR - ( 03 Jan 2019 13:20 )   PT: 12.1 SEC;   INR: 1.09          PTT - ( 03 Jan 2019 13:20 )  PTT:32.8 SEC  CAPILLARY BLOOD GLUCOSE                RADIOLOGY & ADDITIONAL TESTS:    Imaging Personally Reviewed:  [x] YES  [ ] NO    Consultant(s) Notes Reviewed:  [x] YES  [ ] NO      MEDICATIONS  (STANDING):  amLODIPine   Tablet 10 milliGRAM(s) Oral daily  atorvastatin 40 milliGRAM(s) Oral at bedtime  cholecalciferol 1000 Unit(s) Oral two times a day  cloNIDine 0.2 milliGRAM(s) Oral three times a day  furosemide    Tablet 40 milliGRAM(s) Oral daily  losartan 100 milliGRAM(s) Oral daily  metoprolol succinate  milliGRAM(s) Oral daily  metoprolol tartrate Injectable 5 milliGRAM(s) IV Push once  sertraline 100 milliGRAM(s) Oral daily  sodium bicarbonate 650 milliGRAM(s) Oral two times a day    MEDICATIONS  (PRN):  guaiFENesin/dextromethorphan  Syrup 10 milliLiter(s) Oral every 6 hours PRN Cough  ondansetron Injectable 4 milliGRAM(s) IV Push every 4 hours PRN Nausea and/or Vomiting      Care Discussed with Consultants/Other Providers [x] YES  [ ] NO    HEALTH ISSUES - PROBLEM Dx:  Prophylactic measure: Prophylactic measure  Mood disorder: Mood disorder  Post-tussive emesis: Post-tussive emesis  Hypertensive urgency: Hypertensive urgency  Anemia due to chronic kidney disease: Anemia due to chronic kidney disease  Hypertension: Hypertension  ESRD (end stage renal disease): ESRD (end stage renal disease)

## 2019-01-04 NOTE — DISCHARGE NOTE ADULT - PLAN OF CARE
BP under control Continue your home blood pressure medications continue dialysis Tuesday, Thursday, Saturday Stable mood. Resolved continue zofran

## 2019-01-04 NOTE — DISCHARGE NOTE ADULT - PATIENT PORTAL LINK FT
You can access the [facility], offered by [facility], by registering with the following website: [email or web address]

## 2019-01-04 NOTE — PROGRESS NOTE ADULT - PROBLEM SELECTOR PLAN 3
Missed HD today 2/2 vomiting. Pulmonary congestion on CXR. Hyperphosphatemic on labs. Hgb at goal. Renal recs reviewed.  -HD per renal  -c/w home dose sodium bicarbonate

## 2019-01-04 NOTE — DISCHARGE NOTE ADULT - HOSPITAL COURSE
58 W ESRD on HD, essential HTN presents with 2-day c/o vomiting, runny nose, cough. Patient states she has had a cough since 1/2 - productive of clear, non-bloody sputum. Also with runny nose. Patient started vomiting since 1/2 after coughing fits. Has not been able to swallow medications since this morning. States she was given a pill in the ED after which she did not vomit. No abdominal pain or diarrhea. States her boyfriend at home has "bronchitis". Last HD on Thursday. No SOB or leg swelling. No chest pain.    Hypertensive urgency  - sec to missed meds D/T vomiting   - resume home dose of clonidine, amlodipine, losartan.   - resume metoprolol    Post-tussive emesis: Suspect viral URI and post-tussive emesis.  - CXR shows pulmonary congestion, but this is likely a result of missed HD  - RVP negative  - Robitussin-DM prn cough  - Zofran prn nausea.     ESRD (end stage renal disease)  -UF today per renal  -HD on 1/3  per renal  -c/w home dose sodium bicarbonate.     Mood disorder  - c/w sertraline.     Patient able to tolerate her breakfast and lunch. No nausea and vomiting.   BP stable in the 130s systolic. Heart rate in the 90s.   She is stable for discharge.

## 2019-01-04 NOTE — DISCHARGE NOTE ADULT - MEDICATION SUMMARY - MEDICATIONS TO TAKE
I will START or STAY ON the medications listed below when I get home from the hospital:    losartan 100 mg oral tablet  -- 1 tab(s) by mouth once a day  -- Indication: For Hypertension    sodium bicarbonate 650 mg oral tablet  -- 1 tab(s) by mouth 2 times a day  -- Indication: For ESRD (end stage renal disease)    cloNIDine 0.2 mg oral tablet  -- 1 tab(s) by mouth 3 times a day  -- Indication: For Hypertension    sertraline 100 mg oral tablet  -- 1 tab(s) by mouth once a day  -- Indication: For depression     ondansetron 8 mg oral tablet  -- 1 tab(s) by mouth 3 times a day  -- Indication: For Nausea & vomiting    atorvastatin 40 mg oral tablet  -- 1 tab(s) by mouth once a day  -- Indication: For High cholesterol    metoprolol succinate 100 mg oral tablet, extended release  -- 1 tab(s) by mouth once a day  -- Indication: For Hypertension    Norvasc 10 mg oral tablet  -- 1 tab(s) by mouth once a day  -- Indication: For Hypertension    furosemide 40 mg oral tablet  -- 1 tab(s) by mouth once a day  -- Indication: For Hypertension    famotidine 20 mg oral tablet  -- 1 tab(s) by mouth once a day   -- It is very important that you take or use this exactly as directed.  Do not skip doses or discontinue unless directed by your doctor.  Obtain medical advice before taking any non-prescription drugs as some may affect the action of this medication.    -- Indication: For Acid reflux     Vitamin D3 1000 intl units oral capsule  -- 2 cap(s) by mouth 2 times a day  -- Indication: For supplement

## 2019-01-04 NOTE — DISCHARGE NOTE ADULT - CARE PROVIDER_API CALL
Jj Howard), Internal Medicine; Nephrology  [address] [place] [address]  Phone: [phone]  Fax: [phone]

## 2019-01-04 NOTE — DISCHARGE NOTE ADULT - CARE PROVIDERS DIRECT ADDRESSES
[email or web address]

## 2019-02-13 ENCOUNTER — TRANSCRIPTION ENCOUNTER (OUTPATIENT)
Age: 59
End: 2019-02-13

## 2019-02-14 ENCOUNTER — RESULT REVIEW (OUTPATIENT)
Age: 59
End: 2019-02-14

## 2019-02-14 ENCOUNTER — OUTPATIENT (OUTPATIENT)
Dept: OUTPATIENT SERVICES | Facility: HOSPITAL | Age: 59
LOS: 1 days | End: 2019-02-14
Payer: COMMERCIAL

## 2019-02-14 DIAGNOSIS — K21.0 GASTRO-ESOPHAGEAL REFLUX DISEASE WITH ESOPHAGITIS: ICD-10-CM

## 2019-02-14 LAB
POTASSIUM SERPL-MCNC: 3.7 MMOL/L — SIGNIFICANT CHANGE UP (ref 3.5–5.3)
POTASSIUM SERPL-SCNC: 3.7 MMOL/L — SIGNIFICANT CHANGE UP (ref 3.5–5.3)

## 2019-02-14 PROCEDURE — 88305 TISSUE EXAM BY PATHOLOGIST: CPT

## 2019-02-14 PROCEDURE — 88313 SPECIAL STAINS GROUP 2: CPT

## 2019-02-14 PROCEDURE — 88312 SPECIAL STAINS GROUP 1: CPT | Mod: 26

## 2019-02-14 PROCEDURE — 88312 SPECIAL STAINS GROUP 1: CPT

## 2019-02-14 PROCEDURE — 88313 SPECIAL STAINS GROUP 2: CPT | Mod: 26

## 2019-02-14 PROCEDURE — 84132 ASSAY OF SERUM POTASSIUM: CPT

## 2019-02-14 PROCEDURE — 43239 EGD BIOPSY SINGLE/MULTIPLE: CPT

## 2019-02-14 PROCEDURE — 88305 TISSUE EXAM BY PATHOLOGIST: CPT | Mod: 26

## 2019-02-19 LAB — SURGICAL PATHOLOGY STUDY: SIGNIFICANT CHANGE UP

## 2019-03-15 NOTE — ED PROVIDER NOTE - SKIN NEGATIVE STATEMENT, MLM
"PT IRP Treatment    Primary Rehabilitation Diagnosis: CVA  Expected Discharge Date: 04/01/19  Planned Discharge Destination: Home    SUBJECTIVE: Subjective: pt agreeable to PT session with use of [employer] , ID number: [id number]. Pt reports ""I'm dizzy, can I lay down in my bed?\""  (03/15/19 1430)  Subjective/Objective Comments: PT session conducted in gym, pt in w/c with rehab aide at end of session with needs met (03/15/19 1430)    OBJECTIVE:  Precautions  Seizure Precautions: Yes (03/14/19 0710)  Other Precautions: fall risk due to pt impulsivity and poor safety awareness (03/11/19 1031)  Precautions Comments: USe of GigwellJamie  during session (03/11/19 1031)    See below for current functional status overview. See PT flowsheet for full details regarding the PT therapy provided. ASSESSMENT:   Treatment today focused on gait and stair training. Pt tolerates session fair, performing mobility at a min-mod A level with pt requiring frequent seated rest breaks d/t fatigue with pt limited by decreased L sided attention, impaired balance, coordination, and proprioception. Continue POC. PT Identified Barriers to Discharge: medical     This patient participated in all scheduled physical therapy time with this therapist today. EDUCATION:   On this date, education was provided to patient regarding  ambulation and stairs  The response to education was/were: Needs reinforcement    PLAN:   Continue skilled PT, including the following Treatment/Interventions: Functional transfer training;Strengthening; Endurance training;Bed mobility;Gait training;Stairs retraining; Safety Education (03/13/19 0930)   PT Frequency: 7 days/week (03/15/19 1430), Frequency Comments: 60 minutes at least 5x/wk (03/15/19 1430)    Treatment Plan for Next Session: transfer training, bed mobility, gait training with SPC or no device(pt prefers cane- unsuccessful at this time with walker trial, sitting/standing balance, LLE neuro " re-ed, stair negotiation- alternating pattern. Additional Plan Considerations: requires . RECOMMENDATIONS FOR DISCHARGE:  Recommendation for Discharge: PT: Home, Home therapy    PT/OT Mobility Equipment for Discharge: owns [facility] - will continue to monitor (03/13/19 0930)  PT/OT ADL Equipment for Discharge: continue to assess (03/14/19 0931)      FUNCTIONAL DATA OVERVIEW LAST 24 HOURS  Bed Mobility   Bed Mobility  Rolling to the Right: Supervision Pascale Olsen) (03/15/19 0900)  Rolling to the Left: Supervision Pascale Olsen) (03/15/19 0900)  Boosting: Minimal Assist (Min) (03/15/19 0900)  Supine to Sit: Supervision Pascale Olsen) (03/15/19 0900)  Sit to Supine: Supervision Pascale Olsen) (03/15/19 0900)  Bed Mobility Comments: using bed rail for bed mobility ease. supervision for safety, cues for repositioning. minimal assist for scooting/ repositioning in bed and at edge of bed. (03/15/19 0900)    Transfers  Transfers  Sit to Stand: Minimal Assist (Min) (03/15/19 1430)  Stand to Sit: Minimal Assist (Min) (03/15/19 1430)  Stand Pivot Transfers: Minimal Assist (Min); Moderate Assist (Mod) (03/15/19 1430)  Toilet Transfers: Minimal Assist (Min) (03/15/19 0900)  Car Transfers: Minimal Assist (Min)(strong cues for sitting pivot vs stepping into car) (03/15/19 0900)  Assistive Device/: Gait Belt;Cane (03/15/19 1430)  Transfer Comments 1: assist for centering COM over [place] with cues for L attention, postural control, and wt shifting for promotion of L foot clearance. Cues for lining up with chair fully prior to sitting  (03/15/19 1430)    Gait  Gait  Gait Assistance: Minimal Assist (Min); Moderate Assist (Mod) (03/15/19 1430)  Assistive Device/: Gait Belt;Cane (03/15/19 1430)  Ambulation Distance (Feet): 75 Feet(x2) (03/15/19 1430)  Pattern: Decreased stance time L;Uncompensated trendelenburg; Foot drag L (03/15/19 0900)  Ambulation Surface: Tile (03/15/19 0900)  Gait Comments 1: min-mod A with L lean and "cane in RUE. Step through gait pattern with assist for anteriolateral wt shifting for promotion of L foot clerance with ataxic foot placement and cues for attention with pt easily distracted  (03/15/19 1430),      Stairs  Stairs Mobility  Number of Stairs: 12(x2) (03/15/19 1430)  Stair Management Assistance: Minimal Assist (Min) (03/15/19 1430)  Stair Management Technique: One rail R;Step to pattern; Alternating pattern (03/15/19 1430)  Stairs Mobility Comments: on initial trial, pt ascends leading with BLE's with increased diffulty stepping with LLE and cues for L foot attention and placement. On second trial pt ascends/descends reciporcally with min A for balance (03/15/19 1430)    Wheelchair Mobility       Balance  Balance  Standing - Static: Minimal Assist (Min) (03/15/19 1430)  Standing - Dynamic (eyes open):  Moderate Assist (Mod) (03/15/19 1430)    Neuromuscular Re-education  Neuromuscular Re-education  Neuromuscular Re-education 1: standing with no UE support performing LLE toe taps to 'x' in overturned 8"" box x10L for promotion of R wt shift (03/15/19 1430)  " no abrasions, no jaundice, no lesions, no pruritis, and no rashes.

## 2019-04-05 NOTE — ED PROVIDER NOTE - MUSCULOSKELETAL, MLM
HPI:   Naz Salazar is a 48 year old female who presents for evaluation of rash on chin and nose  chief complaint  Location: chin and nose    Condition present for:  awhile.   Previous treatments include: antibiotic cream, steroid gel, and changing make up   Shx: works as an      Review Of Systems  Eyes: negative  Ears/Nose/Throat: negative  Respiratory: No shortness of breath, dyspnea on exertion, cough, or hemoptysis  Cardiovascular: negative  Gastrointestinal: negative  Genitourinary: negative  Musculoskeletal: negative  Neurologic: negative  Psychiatric: negative    This document serves as a record of the services and decisions personally performed and made by Gaby Feliz, MS, PA-C. It was created on her behalf by Jackie Holbrook, a trained medical scribe. The creation of this document is based on the provider's statements to the medical scribe.  Jackie Holbrook 11:46 AM April 5, 2019    PHYSICAL EXAM:    /68   Pulse 78   SpO2 100%   Skin exam performed as follows: Type 2 skin. Mood appropriate  Alert and Oriented X 3. Well developed, well nourished in no distress.  General appearance: Normal  Head including face: Normal  Eyes: conjunctiva and lids: Normal  Mouth: Lips, teeth, gums: Normal  Neck: Normal  Chest-breast/axillae: Normal  Back: Normal  Spleen and liver: Normal  Cardiovascular: Exam of peripheral vascular system by observation for swelling, varicosities, edema: Normal  Genitalia: groin, buttocks: Normal  Extremities: digits/nails (clubbing): Normal  Eccrine and Apocrine glands: Normal  Right upper extremity: Normal  Left upper extremity: Normal  Right lower extremity: Normal  Left lower extremity: Normal  Skin: Scalp and body hair: See below    1. Papular eruption around mouth and NL folds    ASSESSMENT/PLAN:     1. Perioral dermatitis - advised on diagnosis and treatment options. Has tried antibiotic cream, steroid gel, and changing make up in the past.  Discussed PO and topical medications. Discussed metro cream vs minocycline vs prednisone. She would like to proceed with minocycline and metro cream.   --Start  mg BID x 2 months; advised to d/c if HA/dizziness. Advised on GI upset, photosensitivity potential for pigmentation changes.  --Start metrocream BID      Follow-up: 6 weeks/PRN if doing well  CC:   Scribed By: Jackie Holbrook, Medical Scribe    The information in this document, created by the medical scribe for me, accurately reflects the services I personally performed and the decisions made by me. I have reviewed and approved this document for accuracy prior to leaving the patient care area.  April 5, 2019 11:54 AM    Gaby Feliz MS, PA-C     Spine appears normal, range of motion is not limited, no muscle or joint tenderness +Fistula site noted to LUE, intact. Otherwise spine appears normal, range of motion is not limited, no muscle or joint tenderness

## 2019-04-22 ENCOUNTER — INPATIENT (INPATIENT)
Facility: HOSPITAL | Age: 59
LOS: 2 days | Discharge: HOME | End: 2019-04-25
Attending: HOSPITALIST | Admitting: HOSPITALIST
Payer: COMMERCIAL

## 2019-04-22 VITALS
OXYGEN SATURATION: 99 % | TEMPERATURE: 98 F | SYSTOLIC BLOOD PRESSURE: 211 MMHG | HEART RATE: 103 BPM | DIASTOLIC BLOOD PRESSURE: 123 MMHG | RESPIRATION RATE: 20 BRPM

## 2019-04-22 DIAGNOSIS — I16.0 HYPERTENSIVE URGENCY: ICD-10-CM

## 2019-04-22 DIAGNOSIS — R11.2 NAUSEA WITH VOMITING, UNSPECIFIED: ICD-10-CM

## 2019-04-22 DIAGNOSIS — Z29.9 ENCOUNTER FOR PROPHYLACTIC MEASURES, UNSPECIFIED: ICD-10-CM

## 2019-04-22 DIAGNOSIS — N18.6 END STAGE RENAL DISEASE: ICD-10-CM

## 2019-04-22 LAB
ALBUMIN SERPL ELPH-MCNC: 4.6 G/DL — SIGNIFICANT CHANGE UP (ref 3.3–5)
ALP SERPL-CCNC: 130 U/L — HIGH (ref 40–120)
ALT FLD-CCNC: 14 U/L — SIGNIFICANT CHANGE UP (ref 4–33)
ANION GAP SERPL CALC-SCNC: 18 MMO/L — HIGH (ref 7–14)
AST SERPL-CCNC: 44 U/L — HIGH (ref 4–32)
BASE EXCESS BLDV CALC-SCNC: -0.4 MMOL/L — SIGNIFICANT CHANGE UP
BASOPHILS # BLD AUTO: 0.02 K/UL — SIGNIFICANT CHANGE UP (ref 0–0.2)
BASOPHILS NFR BLD AUTO: 0.3 % — SIGNIFICANT CHANGE UP (ref 0–2)
BILIRUB SERPL-MCNC: 0.6 MG/DL — SIGNIFICANT CHANGE UP (ref 0.2–1.2)
BLOOD GAS VENOUS - CREATININE: 8.29 MG/DL — HIGH (ref 0.5–1.3)
BUN SERPL-MCNC: 37 MG/DL — HIGH (ref 7–23)
CALCIUM SERPL-MCNC: 10.1 MG/DL — SIGNIFICANT CHANGE UP (ref 8.4–10.5)
CHLORIDE BLDV-SCNC: 102 MMOL/L — SIGNIFICANT CHANGE UP (ref 96–108)
CHLORIDE SERPL-SCNC: 97 MMOL/L — LOW (ref 98–107)
CO2 SERPL-SCNC: 23 MMOL/L — SIGNIFICANT CHANGE UP (ref 22–31)
CREAT SERPL-MCNC: 7.41 MG/DL — HIGH (ref 0.5–1.3)
EOSINOPHIL # BLD AUTO: 0.05 K/UL — SIGNIFICANT CHANGE UP (ref 0–0.5)
EOSINOPHIL NFR BLD AUTO: 0.7 % — SIGNIFICANT CHANGE UP (ref 0–6)
GAS PNL BLDV: 136 MMOL/L — SIGNIFICANT CHANGE UP (ref 136–146)
GLUCOSE BLDV-MCNC: 131 — HIGH (ref 70–99)
GLUCOSE SERPL-MCNC: 128 MG/DL — HIGH (ref 70–99)
HCO3 BLDV-SCNC: 23 MMOL/L — SIGNIFICANT CHANGE UP (ref 20–27)
HCT VFR BLD CALC: 38.4 % — SIGNIFICANT CHANGE UP (ref 34.5–45)
HCT VFR BLDV CALC: 36.8 % — SIGNIFICANT CHANGE UP (ref 34.5–45)
HGB BLD-MCNC: 11.7 G/DL — SIGNIFICANT CHANGE UP (ref 11.5–15.5)
HGB BLDV-MCNC: 11.9 G/DL — SIGNIFICANT CHANGE UP (ref 11.5–15.5)
IMM GRANULOCYTES NFR BLD AUTO: 0.3 % — SIGNIFICANT CHANGE UP (ref 0–1.5)
LACTATE BLDV-MCNC: 1.6 MMOL/L — SIGNIFICANT CHANGE UP (ref 0.5–2)
LIDOCAIN IGE QN: 93.7 U/L — HIGH (ref 7–60)
LYMPHOCYTES # BLD AUTO: 0.85 K/UL — LOW (ref 1–3.3)
LYMPHOCYTES # BLD AUTO: 12.1 % — LOW (ref 13–44)
MCHC RBC-ENTMCNC: 26.4 PG — LOW (ref 27–34)
MCHC RBC-ENTMCNC: 30.5 % — LOW (ref 32–36)
MCV RBC AUTO: 86.5 FL — SIGNIFICANT CHANGE UP (ref 80–100)
MONOCYTES # BLD AUTO: 0.55 K/UL — SIGNIFICANT CHANGE UP (ref 0–0.9)
MONOCYTES NFR BLD AUTO: 7.8 % — SIGNIFICANT CHANGE UP (ref 2–14)
NEUTROPHILS # BLD AUTO: 5.52 K/UL — SIGNIFICANT CHANGE UP (ref 1.8–7.4)
NEUTROPHILS NFR BLD AUTO: 78.8 % — HIGH (ref 43–77)
NRBC # FLD: 0 K/UL — SIGNIFICANT CHANGE UP (ref 0–0)
PCO2 BLDV: 46 MMHG — SIGNIFICANT CHANGE UP (ref 41–51)
PH BLDV: 7.35 PH — SIGNIFICANT CHANGE UP (ref 7.32–7.43)
PLATELET # BLD AUTO: 156 K/UL — SIGNIFICANT CHANGE UP (ref 150–400)
PMV BLD: 9.3 FL — SIGNIFICANT CHANGE UP (ref 7–13)
PO2 BLDV: 47 MMHG — HIGH (ref 35–40)
POTASSIUM BLDV-SCNC: SIGNIFICANT CHANGE UP MMOL/L (ref 3.4–4.5)
POTASSIUM SERPL-MCNC: 5.1 MMOL/L — SIGNIFICANT CHANGE UP (ref 3.5–5.3)
POTASSIUM SERPL-SCNC: 5.1 MMOL/L — SIGNIFICANT CHANGE UP (ref 3.5–5.3)
PROT SERPL-MCNC: 8.6 G/DL — HIGH (ref 6–8.3)
RBC # BLD: 4.44 M/UL — SIGNIFICANT CHANGE UP (ref 3.8–5.2)
RBC # FLD: 19.9 % — HIGH (ref 10.3–14.5)
SAO2 % BLDV: 76 % — SIGNIFICANT CHANGE UP (ref 60–85)
SODIUM SERPL-SCNC: 138 MMOL/L — SIGNIFICANT CHANGE UP (ref 135–145)
TROPONIN T, HIGH SENSITIVITY: 66 NG/L — CRITICAL HIGH (ref ?–14)
WBC # BLD: 7.01 K/UL — SIGNIFICANT CHANGE UP (ref 3.8–10.5)
WBC # FLD AUTO: 7.01 K/UL — SIGNIFICANT CHANGE UP (ref 3.8–10.5)

## 2019-04-22 PROCEDURE — 99223 1ST HOSP IP/OBS HIGH 75: CPT

## 2019-04-22 PROCEDURE — 76705 ECHO EXAM OF ABDOMEN: CPT | Mod: 26

## 2019-04-22 RX ORDER — LOSARTAN POTASSIUM 100 MG/1
100 TABLET, FILM COATED ORAL ONCE
Qty: 0 | Refills: 0 | Status: COMPLETED | OUTPATIENT
Start: 2019-04-22 | End: 2019-04-22

## 2019-04-22 RX ORDER — SODIUM BICARBONATE 1 MEQ/ML
1300 SYRINGE (ML) INTRAVENOUS DAILY
Qty: 0 | Refills: 0 | Status: DISCONTINUED | OUTPATIENT
Start: 2019-04-22 | End: 2019-04-24

## 2019-04-22 RX ORDER — SERTRALINE 25 MG/1
100 TABLET, FILM COATED ORAL DAILY
Qty: 0 | Refills: 0 | Status: DISCONTINUED | OUTPATIENT
Start: 2019-04-22 | End: 2019-04-25

## 2019-04-22 RX ORDER — CHOLECALCIFEROL (VITAMIN D3) 125 MCG
2 CAPSULE ORAL
Qty: 0 | Refills: 0 | COMMUNITY

## 2019-04-22 RX ORDER — ALLOPURINOL 300 MG
1 TABLET ORAL
Qty: 0 | Refills: 0 | COMMUNITY

## 2019-04-22 RX ORDER — PROCHLORPERAZINE MALEATE 5 MG
25 TABLET ORAL ONCE
Qty: 0 | Refills: 0 | Status: DISCONTINUED | OUTPATIENT
Start: 2019-04-22 | End: 2019-04-22

## 2019-04-22 RX ORDER — ALLOPURINOL 300 MG
200 TABLET ORAL
Qty: 0 | Refills: 0 | Status: DISCONTINUED | OUTPATIENT
Start: 2019-04-22 | End: 2019-04-25

## 2019-04-22 RX ORDER — ONDANSETRON 8 MG/1
4 TABLET, FILM COATED ORAL ONCE
Qty: 0 | Refills: 0 | Status: COMPLETED | OUTPATIENT
Start: 2019-04-22 | End: 2019-04-22

## 2019-04-22 RX ORDER — FUROSEMIDE 40 MG
1 TABLET ORAL
Qty: 0 | Refills: 0 | COMMUNITY

## 2019-04-22 RX ORDER — METOPROLOL TARTRATE 50 MG
1 TABLET ORAL
Qty: 0 | Refills: 0 | COMMUNITY

## 2019-04-22 RX ORDER — AMLODIPINE BESYLATE 2.5 MG/1
10 TABLET ORAL DAILY
Qty: 0 | Refills: 0 | Status: DISCONTINUED | OUTPATIENT
Start: 2019-04-22 | End: 2019-04-25

## 2019-04-22 RX ORDER — PANTOPRAZOLE SODIUM 20 MG/1
40 TABLET, DELAYED RELEASE ORAL ONCE
Qty: 0 | Refills: 0 | Status: COMPLETED | OUTPATIENT
Start: 2019-04-22 | End: 2019-04-22

## 2019-04-22 RX ORDER — CHOLECALCIFEROL (VITAMIN D3) 125 MCG
2000 CAPSULE ORAL
Qty: 0 | Refills: 0 | Status: DISCONTINUED | OUTPATIENT
Start: 2019-04-22 | End: 2019-04-25

## 2019-04-22 RX ORDER — METOPROLOL TARTRATE 50 MG
100 TABLET ORAL ONCE
Qty: 0 | Refills: 0 | Status: COMPLETED | OUTPATIENT
Start: 2019-04-22 | End: 2019-04-22

## 2019-04-22 RX ORDER — FUROSEMIDE 40 MG
40 TABLET ORAL DAILY
Qty: 0 | Refills: 0 | Status: DISCONTINUED | OUTPATIENT
Start: 2019-04-22 | End: 2019-04-25

## 2019-04-22 RX ORDER — SODIUM CHLORIDE 9 MG/ML
500 INJECTION INTRAMUSCULAR; INTRAVENOUS; SUBCUTANEOUS ONCE
Qty: 0 | Refills: 0 | Status: COMPLETED | OUTPATIENT
Start: 2019-04-22 | End: 2019-04-22

## 2019-04-22 RX ORDER — AMLODIPINE BESYLATE 2.5 MG/1
1 TABLET ORAL
Qty: 0 | Refills: 0 | COMMUNITY

## 2019-04-22 RX ORDER — ONDANSETRON 8 MG/1
4 TABLET, FILM COATED ORAL ONCE
Qty: 0 | Refills: 0 | Status: COMPLETED | OUTPATIENT
Start: 2019-04-22 | End: 2019-04-23

## 2019-04-22 RX ORDER — PROCHLORPERAZINE MALEATE 5 MG
10 TABLET ORAL ONCE
Qty: 0 | Refills: 0 | Status: COMPLETED | OUTPATIENT
Start: 2019-04-22 | End: 2019-04-22

## 2019-04-22 RX ORDER — SEVELAMER CARBONATE 2400 MG/1
3 POWDER, FOR SUSPENSION ORAL
Qty: 0 | Refills: 0 | COMMUNITY

## 2019-04-22 RX ORDER — SODIUM CHLORIDE 9 MG/ML
1000 INJECTION INTRAMUSCULAR; INTRAVENOUS; SUBCUTANEOUS ONCE
Qty: 0 | Refills: 0 | Status: COMPLETED | OUTPATIENT
Start: 2019-04-22 | End: 2019-04-22

## 2019-04-22 RX ORDER — SEVELAMER CARBONATE 2400 MG/1
2400 POWDER, FOR SUSPENSION ORAL THREE TIMES A DAY
Qty: 0 | Refills: 0 | Status: DISCONTINUED | OUTPATIENT
Start: 2019-04-22 | End: 2019-04-25

## 2019-04-22 RX ORDER — LABETALOL HCL 100 MG
5 TABLET ORAL ONCE
Qty: 0 | Refills: 0 | Status: COMPLETED | OUTPATIENT
Start: 2019-04-22 | End: 2019-04-22

## 2019-04-22 RX ORDER — METOCLOPRAMIDE HCL 10 MG
5 TABLET ORAL ONCE
Qty: 0 | Refills: 0 | Status: COMPLETED | OUTPATIENT
Start: 2019-04-22 | End: 2019-04-22

## 2019-04-22 RX ORDER — HEPARIN SODIUM 5000 [USP'U]/ML
5000 INJECTION INTRAVENOUS; SUBCUTANEOUS EVERY 8 HOURS
Qty: 0 | Refills: 0 | Status: DISCONTINUED | OUTPATIENT
Start: 2019-04-22 | End: 2019-04-25

## 2019-04-22 RX ORDER — METOPROLOL TARTRATE 50 MG
100 TABLET ORAL DAILY
Qty: 0 | Refills: 0 | Status: DISCONTINUED | OUTPATIENT
Start: 2019-04-23 | End: 2019-04-25

## 2019-04-22 RX ORDER — ONDANSETRON 8 MG/1
4 TABLET, FILM COATED ORAL EVERY 8 HOURS
Qty: 0 | Refills: 0 | Status: DISCONTINUED | OUTPATIENT
Start: 2019-04-22 | End: 2019-04-25

## 2019-04-22 RX ORDER — ALLOPURINOL 300 MG
100 TABLET ORAL
Qty: 0 | Refills: 0 | Status: DISCONTINUED | OUTPATIENT
Start: 2019-04-22 | End: 2019-04-25

## 2019-04-22 RX ORDER — LABETALOL HCL 100 MG
5 TABLET ORAL ONCE
Qty: 0 | Refills: 0 | Status: COMPLETED | OUTPATIENT
Start: 2019-04-22 | End: 2019-04-23

## 2019-04-22 RX ORDER — METOCLOPRAMIDE HCL 10 MG
10 TABLET ORAL ONCE
Qty: 0 | Refills: 0 | Status: DISCONTINUED | OUTPATIENT
Start: 2019-04-22 | End: 2019-04-22

## 2019-04-22 RX ADMIN — Medication 0.2 MILLIGRAM(S): at 12:06

## 2019-04-22 RX ADMIN — SODIUM CHLORIDE 500 MILLILITER(S): 9 INJECTION INTRAMUSCULAR; INTRAVENOUS; SUBCUTANEOUS at 18:02

## 2019-04-22 RX ADMIN — ONDANSETRON 4 MILLIGRAM(S): 8 TABLET, FILM COATED ORAL at 17:52

## 2019-04-22 RX ADMIN — ONDANSETRON 4 MILLIGRAM(S): 8 TABLET, FILM COATED ORAL at 08:00

## 2019-04-22 RX ADMIN — LOSARTAN POTASSIUM 100 MILLIGRAM(S): 100 TABLET, FILM COATED ORAL at 12:06

## 2019-04-22 RX ADMIN — Medication 0.2 MILLIGRAM(S): at 22:24

## 2019-04-22 RX ADMIN — Medication 5 MILLIGRAM(S): at 17:51

## 2019-04-22 RX ADMIN — Medication 40 MILLIGRAM(S): at 22:25

## 2019-04-22 RX ADMIN — Medication 2000 UNIT(S): at 22:25

## 2019-04-22 RX ADMIN — SODIUM CHLORIDE 1000 MILLILITER(S): 9 INJECTION INTRAMUSCULAR; INTRAVENOUS; SUBCUTANEOUS at 09:46

## 2019-04-22 RX ADMIN — Medication 5 MILLIGRAM(S): at 09:35

## 2019-04-22 RX ADMIN — Medication 5 MILLIGRAM(S): at 21:18

## 2019-04-22 RX ADMIN — Medication 100 MILLIGRAM(S): at 13:31

## 2019-04-22 RX ADMIN — PANTOPRAZOLE SODIUM 40 MILLIGRAM(S): 20 TABLET, DELAYED RELEASE ORAL at 17:52

## 2019-04-22 RX ADMIN — HEPARIN SODIUM 5000 UNIT(S): 5000 INJECTION INTRAVENOUS; SUBCUTANEOUS at 22:24

## 2019-04-22 RX ADMIN — SEVELAMER CARBONATE 2400 MILLIGRAM(S): 2400 POWDER, FOR SUSPENSION ORAL at 22:25

## 2019-04-22 RX ADMIN — ONDANSETRON 4 MILLIGRAM(S): 8 TABLET, FILM COATED ORAL at 20:48

## 2019-04-22 RX ADMIN — Medication 1300 MILLIGRAM(S): at 22:28

## 2019-04-22 RX ADMIN — SODIUM CHLORIDE 1000 MILLILITER(S): 9 INJECTION INTRAMUSCULAR; INTRAVENOUS; SUBCUTANEOUS at 08:00

## 2019-04-22 RX ADMIN — Medication 10 MILLIGRAM(S): at 12:05

## 2019-04-22 RX ADMIN — AMLODIPINE BESYLATE 10 MILLIGRAM(S): 2.5 TABLET ORAL at 22:24

## 2019-04-22 RX ADMIN — PANTOPRAZOLE SODIUM 40 MILLIGRAM(S): 20 TABLET, DELAYED RELEASE ORAL at 22:25

## 2019-04-22 NOTE — H&P ADULT - PROBLEM SELECTOR PLAN 1
Unclear significance of distended GB on US.  Symptoms possibly from gastritis.  Lipase mildly elevated, but likely reactive.    - IV protonix  - Zofran PRN Unclear significance of distended GB on US.  Symptoms possibly from gastritis.  Lipase mildly elevated, but likely reactive.    - IV protonix  - Zofran PRN  - GI consult in am

## 2019-04-22 NOTE — ED PROVIDER NOTE - OBJECTIVE STATEMENT
58 W ESRD on HD (T/Th/Sa), has not missed any dialysis sessions, also with essential HTN, gout, distant hx of breast ca presents with nausea and vomiting. Started abruptly this morning and woke her from sleep. Report she was feeling well yesterday and did not have any sx until this morning. Reports several episodes of bilious vomitus. +Nausea. No diarrhea. Reports abd cramping but reports it is not severe. No recent illness or travel. Denies fever, chills, chest pain, headache, dizziness, or any other concerns.

## 2019-04-22 NOTE — H&P ADULT - NSICDXPASTMEDICALHX_GEN_ALL_CORE_FT
PAST MEDICAL HISTORY:  Anemia due to chronic kidney disease     Breast CA, right     Chronic kidney disease (CKD)     Depression     Gout     Herniated lumbar intervertebral disc     HLD (hyperlipidemia)     Hypertension     Renal insufficiency     Spinal stenosis

## 2019-04-22 NOTE — ED ADULT NURSE REASSESSMENT NOTE - NS ED NURSE REASSESS COMMENT FT1
patient unable to tolerate po, vomited when provided crackers and gingerale, patient remains hypertensive but asymptomatic.

## 2019-04-22 NOTE — ED PROVIDER NOTE - NS ED ROS FT
General: No fevers / chills  HENT: No head trauma, ear pain, runny nose, or sore throat  Eyes: No visual changes  CP: No chest pain, palpitations, or lightheadedness  Resp: No shortness of breath, no cough  GI: No abdominal pain, diarrhea, constipation, +nausea and vomiting  : No urinary fz, dysuria, or hematuria  Neuro: No numbness, tingling, or weakness

## 2019-04-22 NOTE — ED ADULT NURSE NOTE - NSIMPLEMENTINTERV_GEN_ALL_ED
Implemented All Universal Safety Interventions:  [place] to call system. Call bell, personal items and telephone within reach. Instruct patient to call for assistance. Room bathroom lighting operational. Non-slip footwear when patient is off stretcher. Physically safe environment: no spills, clutter or unnecessary equipment. Stretcher in lowest position, wheels locked, appropriate side rails in place.

## 2019-04-22 NOTE — ED PROVIDER NOTE - ATTENDING CONTRIBUTION TO CARE
58F vomiting.  HTN, tachyardia in ED.  H/o ESRD on HD.  N/V x 1 day, a/w abd cramping.  Last Dialysis was sat.  Abd was nontender.  Admission in NoV for pancreatitis.  EKG unremarkable.  Plan to check labs, rx symptomatic tx, check lipase, reassess.  Nephr Dr YELENA Howard.  VS:  HTN, tachycardia    GEN - malaise, mild distress retching; A+O x3   HEAD - NC/AT     ENT - PEERL, EOMI, mucous membranes dry , no discharge      NECK: Neck supple, non-tender without lymphadenopathy, no masses, no JVD  PULM - CTA b/l,  symmetric breath sounds  COR -  normal heart sounds    ABD - , ND, epig ttp, soft,  BACK - no CVA tenderness, nontender spine     EXTREMS - no edema, no deformity, warm and well perfused  .  LUE AVF (+)thrill  SKIN - no rash or bruising      NEUROLOGIC - alert, CN 2-12 intact, sensation nl, motor no focal deficit.

## 2019-04-22 NOTE — ED PROVIDER NOTE - PROGRESS NOTE DETAILS
pt has been sleeping comfortably for the past hour, reassessed by me and reports she is feeling better, has not been vomiting, tolerated apple sauce and crackers, BP has remained high despite some of her  home medications, toprol recently given, will recheck BP and assess for dispo shortly   Savanna Giraldo, PGY-2 EM

## 2019-04-22 NOTE — CHART NOTE - NSCHARTNOTEFT_GEN_A_CORE
MAR Note-PGY3    Patient admitted to Dr. Israel and therefore screened. Patient continues to have nausea however reports that zofran earlier helped. Additionally reports that she has epigastric dull pain which continues. Also has continued HTN urgency despite antihypertensives. Will dose labetalol, pantoprazole, and zofran now.    Matt Jordan MD PGY3 MAR Note-PGY3    Patient admitted to Dr. Israel and therefore screened. Patient continues to have nausea however reports that zofran earlier helped. Additionally reports that she has epigastric dull pain which continues. Also has continued HTN urgency despite antihypertensives. Will dose labetalol, pantoprazole, and zofran now. Ordered Troponin given vague dull pain and high risk for vascular disease given HD. EKG reviewed w/o PRIYA however mild ST segment depression laterally.    Matt Jordan MD PGY3

## 2019-04-22 NOTE — H&P ADULT - NSHPREVIEWOFSYSTEMS_GEN_ALL_CORE
Review of Systems:   CONSTITUTIONAL: subjective fever and chills  EYES: No eye pain, visual disturbances, or discharge  ENMT:  No difficulty hearing, no throat pain  NECK: No pain or stiffness  RESPIRATORY: No cough, No shortness of breath  CARDIOVASCULAR: No chest pain, palpitations, dizziness, or leg swelling  GASTROINTESTINAL: abdominal pain, nausea and vomiting. No diarrhea  GENITOURINARY: No dysuria, or hematuria  NEUROLOGICAL: No headaches, weakness, or numbness  SKIN: No rashes, or lesions   LYMPH NODES: No enlarged glands  ENDOCRINE: No heat or cold intolerance  MUSCULOSKELETAL: No joint pain or swelling  PSYCHIATRIC: No depression or anxiety  HEME/LYMPH: No easy bruising, or bleeding  ALLERGY AND IMMUNOLOGIC: No hives or eczema

## 2019-04-22 NOTE — ED PROVIDER NOTE - CLINICAL SUMMARY MEDICAL DECISION MAKING FREE TEXT BOX
Pt presents with nausea and vomiting, started abruptly this morning, has not missed any dialysis sessions, currently hypertensive though did not BP meds this morning, otherwise mildly tachycardic, afebrile. Abd exam unremarkable. Pt retching but not actively vomiting. Basic labs + lipase pending, consider imaging if symptoms persist, fluids/zofran, reassess  Savanna Giraldo, PGY-2 EM

## 2019-04-22 NOTE — ED ADULT NURSE REASSESSMENT NOTE - NS ED NURSE REASSESS COMMENT FT1
break cvg rn: patient resting comfortably in stretcher, remains hypertensive however asymptomatic at this, resps even and unlabored denies any chest pain or sob, md correa made aware, pending further orders at this time will ctm

## 2019-04-22 NOTE — H&P ADULT - NSHPLABSRESULTS_GEN_ALL_CORE
04-22    138  |  97<L>  |  37<H>  ----------------------------<  128<H>  5.1   |  23  |  7.41<H>    Ca    10.1      22 Apr 2019 07:58    TPro  8.6<H>  /  Alb  4.6  /  TBili  0.6  /  DBili  x   /  AST  44<H>  /  ALT  14  /  AlkPhos  130<H>  04-22                            11.7   7.01  )-----------( 156      ( 22 Apr 2019 07:58 )             38.4     < from: US Abdomen Limited (04.22.19 @ 11:22) >    Gallbladder: Distended gallbladder. No gallstones, mural thickening, or   pericholecystic fluid. Patient was tender over the epigastrium.      < end of copied text > 04-22    138  |  97<L>  |  37<H>  ----------------------------<  128<H>  5.1   |  23  |  7.41<H>    Ca    10.1      22 Apr 2019 07:58    TPro  8.6<H>  /  Alb  4.6  /  TBili  0.6  /  DBili  x   /  AST  44<H>  /  ALT  14  /  AlkPhos  130<H>  04-22                            11.7   7.01  )-----------( 156      ( 22 Apr 2019 07:58 )             38.4     < from: US Abdomen Limited (04.22.19 @ 11:22) >    Gallbladder: Distended gallbladder. No gallstones, mural thickening, or   pericholecystic fluid. Patient was tender over the epigastrium.      < end of copied text >    EKG tracing reviewed: NSR similar to prior study

## 2019-04-22 NOTE — H&P ADULT - NSHPPHYSICALEXAM_GEN_ALL_CORE
Vital Signs Last 24 Hrs  T(C): 36.6 (22 Apr 2019 18:33), Max: 36.7 (22 Apr 2019 13:07)  T(F): 97.9 (22 Apr 2019 18:33), Max: 98 (22 Apr 2019 13:07)  HR: 100 (22 Apr 2019 18:33) (99 - 113)  BP: 168/96 (22 Apr 2019 18:33) (168/96 - 238/130)  BP(mean): --  RR: 18 (22 Apr 2019 18:33) (18 - 20)  SpO2: 100% (22 Apr 2019 18:33) (95% - 100%)    PHYSICAL EXAM:  GENERAL: No Acute Distress  EYES: conjunctiva and sclera clear  ENMT: Moist mucous membranes   NECK: Supple  PULMONARY: Clear to auscultation bilaterally  CARDIAC: Regular rate and rhythm; No murmurs, rubs, or gallops  GASTROINTESTINAL: Abdomen soft, Nontender, Nondistended; Bowel sounds normal  EXTREMITIES:   No clubbing, cyanosis, or pedal edema  PSYCH: Normal Affect, Normal Behavior  NEUROLOGY:   - Mental status A&O x 3,  SKIN: No rashes or lesions  MUSCULOSKELETAL: No joint swelling

## 2019-04-22 NOTE — ED ADULT NURSE NOTE - OBJECTIVE STATEMENT
BIBEMS from home, ESRD patient with T/Th/S with L UA AV fistula (+ bruits/thrills), in pain, actively inducing vomiting from Triage, accompanied by SO (smell of urine on both patient and SO), complaining of diffuse ABD pain since waking up this morning. Emesis looks like mucus. Patient reports that she makes no urine. Abdomen soft, non-tender, no guarding on exam. Uncontrolled HTN even with medications. MD resident at bedside. Safety maintained, needs attended, will continue to monitor. BIBEMS from home, ESRD patient with T/Th/S with L LA AV fistula (+ bruits/thrills), in pain, actively inducing vomiting from Triage, accompanied by SO (smell of urine on both patient and SO), complaining of diffuse ABD pain since waking up this morning. Emesis looks like mucus. Patient reports that she makes  urine. Abdomen soft, non-tender, no guarding on exam. Uncontrolled HTN even with medications. MD resident at bedside. Safety maintained, needs attended, will continue to monitor.

## 2019-04-22 NOTE — H&P ADULT - HISTORY OF PRESENT ILLNESS
57 y/o F with HTN, ESRD on HD (TTS), gout, and depression p/w abd pain and nausea.  Pt reports vomiting starting around 3am today along with dull epigastric pain.  No hematemesis or coffee grounds.  Normal BM's.  She reports subjective fever and chills.  No URI symptoms.  No sick contacts.  No headache or visual changes.  She has been unable to keep down food or her medications today.  She had similar presentation last fall, and winter, and had outpatient EGD with path showing reactive gastropathy.      In the ED, pt was given compazine, and Zofran with temporary relief of nausea.  She had BP elevated to 230/132 and was given Clonidine, labetalol, and losartan.

## 2019-04-22 NOTE — ED ADULT TRIAGE NOTE - CHIEF COMPLAINT QUOTE
Presents to ED for abdominal pain.  Patient started experiencing abdominal pain with associated nausea, vomiting and dizziness this AM.  No chest pain, SOB or weakness.  History of HTN, ESRD with dialysis on Tu/TH/SAT.  Patient appears uncomfortable but in no apparent distress.

## 2019-04-22 NOTE — CHART NOTE - NSCHARTNOTEFT_GEN_A_CORE
ADS     Pt troponin 66. S/w hospitalist. Pt w/ ESRD, can attribute to elevated level. Pt w/o chest pain, EKG no acute changes. Will continue to monitor for now.     SHAZIA Zhou   [id number]

## 2019-04-23 LAB
ALBUMIN SERPL ELPH-MCNC: 4.1 G/DL — SIGNIFICANT CHANGE UP (ref 3.3–5)
ALBUMIN SERPL ELPH-MCNC: 4.2 G/DL — SIGNIFICANT CHANGE UP (ref 3.3–5)
ALP SERPL-CCNC: 121 U/L — HIGH (ref 40–120)
ALP SERPL-CCNC: 126 U/L — HIGH (ref 40–120)
ALT FLD-CCNC: 10 U/L — SIGNIFICANT CHANGE UP (ref 4–33)
ALT FLD-CCNC: 9 U/L — SIGNIFICANT CHANGE UP (ref 4–33)
ANION GAP SERPL CALC-SCNC: 19 MMO/L — HIGH (ref 7–14)
ANION GAP SERPL CALC-SCNC: 21 MMO/L — HIGH (ref 7–14)
AST SERPL-CCNC: 16 U/L — SIGNIFICANT CHANGE UP (ref 4–32)
AST SERPL-CCNC: 17 U/L — SIGNIFICANT CHANGE UP (ref 4–32)
BILIRUB SERPL-MCNC: 0.8 MG/DL — SIGNIFICANT CHANGE UP (ref 0.2–1.2)
BILIRUB SERPL-MCNC: 0.8 MG/DL — SIGNIFICANT CHANGE UP (ref 0.2–1.2)
BUN SERPL-MCNC: 47 MG/DL — HIGH (ref 7–23)
BUN SERPL-MCNC: 50 MG/DL — HIGH (ref 7–23)
CALCIUM SERPL-MCNC: 10 MG/DL — SIGNIFICANT CHANGE UP (ref 8.4–10.5)
CALCIUM SERPL-MCNC: 10.7 MG/DL — HIGH (ref 8.4–10.5)
CHLORIDE SERPL-SCNC: 96 MMOL/L — LOW (ref 98–107)
CHLORIDE SERPL-SCNC: 99 MMOL/L — SIGNIFICANT CHANGE UP (ref 98–107)
CO2 SERPL-SCNC: 20 MMOL/L — LOW (ref 22–31)
CO2 SERPL-SCNC: 21 MMOL/L — LOW (ref 22–31)
CREAT SERPL-MCNC: 8.96 MG/DL — HIGH (ref 0.5–1.3)
CREAT SERPL-MCNC: 9.04 MG/DL — HIGH (ref 0.5–1.3)
GLUCOSE SERPL-MCNC: 101 MG/DL — HIGH (ref 70–99)
GLUCOSE SERPL-MCNC: 95 MG/DL — SIGNIFICANT CHANGE UP (ref 70–99)
HCT VFR BLD CALC: 35.8 % — SIGNIFICANT CHANGE UP (ref 34.5–45)
HGB BLD-MCNC: 10.8 G/DL — LOW (ref 11.5–15.5)
LIDOCAIN IGE QN: 267 U/L — HIGH (ref 7–60)
LIDOCAIN IGE QN: 294.3 U/L — HIGH (ref 7–60)
MAGNESIUM SERPL-MCNC: 2.4 MG/DL — SIGNIFICANT CHANGE UP (ref 1.6–2.6)
MAGNESIUM SERPL-MCNC: 2.5 MG/DL — SIGNIFICANT CHANGE UP (ref 1.6–2.6)
MCHC RBC-ENTMCNC: 26.3 PG — LOW (ref 27–34)
MCHC RBC-ENTMCNC: 30.2 % — LOW (ref 32–36)
MCV RBC AUTO: 87.3 FL — SIGNIFICANT CHANGE UP (ref 80–100)
NRBC # FLD: 0.02 K/UL — SIGNIFICANT CHANGE UP (ref 0–0)
PLATELET # BLD AUTO: 175 K/UL — SIGNIFICANT CHANGE UP (ref 150–400)
PMV BLD: 10 FL — SIGNIFICANT CHANGE UP (ref 7–13)
POTASSIUM SERPL-MCNC: 4.7 MMOL/L — SIGNIFICANT CHANGE UP (ref 3.5–5.3)
POTASSIUM SERPL-MCNC: 4.7 MMOL/L — SIGNIFICANT CHANGE UP (ref 3.5–5.3)
POTASSIUM SERPL-SCNC: 4.7 MMOL/L — SIGNIFICANT CHANGE UP (ref 3.5–5.3)
POTASSIUM SERPL-SCNC: 4.7 MMOL/L — SIGNIFICANT CHANGE UP (ref 3.5–5.3)
PROT SERPL-MCNC: 7.4 G/DL — SIGNIFICANT CHANGE UP (ref 6–8.3)
PROT SERPL-MCNC: 7.4 G/DL — SIGNIFICANT CHANGE UP (ref 6–8.3)
RBC # BLD: 4.1 M/UL — SIGNIFICANT CHANGE UP (ref 3.8–5.2)
RBC # FLD: 20.5 % — HIGH (ref 10.3–14.5)
SODIUM SERPL-SCNC: 137 MMOL/L — SIGNIFICANT CHANGE UP (ref 135–145)
SODIUM SERPL-SCNC: 139 MMOL/L — SIGNIFICANT CHANGE UP (ref 135–145)
TROPONIN T, HIGH SENSITIVITY: 66 NG/L — CRITICAL HIGH (ref ?–14)
TROPONIN T, HIGH SENSITIVITY: 71 NG/L — CRITICAL HIGH (ref ?–14)
WBC # BLD: 6.71 K/UL — SIGNIFICANT CHANGE UP (ref 3.8–10.5)
WBC # FLD AUTO: 6.71 K/UL — SIGNIFICANT CHANGE UP (ref 3.8–10.5)

## 2019-04-23 PROCEDURE — 93306 TTE W/DOPPLER COMPLETE: CPT | Mod: 26

## 2019-04-23 PROCEDURE — 93018 CV STRESS TEST I&R ONLY: CPT | Mod: GC

## 2019-04-23 PROCEDURE — 93016 CV STRESS TEST SUPVJ ONLY: CPT | Mod: GC

## 2019-04-23 PROCEDURE — 78452 HT MUSCLE IMAGE SPECT MULT: CPT | Mod: 26

## 2019-04-23 RX ORDER — HYDRALAZINE HCL 50 MG
10 TABLET ORAL ONCE
Qty: 0 | Refills: 0 | Status: COMPLETED | OUTPATIENT
Start: 2019-04-23 | End: 2019-04-23

## 2019-04-23 RX ADMIN — SERTRALINE 100 MILLIGRAM(S): 25 TABLET, FILM COATED ORAL at 12:40

## 2019-04-23 RX ADMIN — Medication 0.2 MILLIGRAM(S): at 12:40

## 2019-04-23 RX ADMIN — Medication 200 MILLIGRAM(S): at 08:43

## 2019-04-23 RX ADMIN — Medication 5 MILLIGRAM(S): at 00:14

## 2019-04-23 RX ADMIN — HEPARIN SODIUM 5000 UNIT(S): 5000 INJECTION INTRAVENOUS; SUBCUTANEOUS at 12:40

## 2019-04-23 RX ADMIN — SEVELAMER CARBONATE 2400 MILLIGRAM(S): 2400 POWDER, FOR SUSPENSION ORAL at 12:39

## 2019-04-23 RX ADMIN — Medication 1300 MILLIGRAM(S): at 12:39

## 2019-04-23 RX ADMIN — Medication 0.2 MILLIGRAM(S): at 06:27

## 2019-04-23 RX ADMIN — Medication 2000 UNIT(S): at 06:27

## 2019-04-23 RX ADMIN — SEVELAMER CARBONATE 2400 MILLIGRAM(S): 2400 POWDER, FOR SUSPENSION ORAL at 06:27

## 2019-04-23 RX ADMIN — Medication 0.2 MILLIGRAM(S): at 22:07

## 2019-04-23 RX ADMIN — HEPARIN SODIUM 5000 UNIT(S): 5000 INJECTION INTRAVENOUS; SUBCUTANEOUS at 06:28

## 2019-04-23 RX ADMIN — AMLODIPINE BESYLATE 10 MILLIGRAM(S): 2.5 TABLET ORAL at 06:27

## 2019-04-23 RX ADMIN — HEPARIN SODIUM 5000 UNIT(S): 5000 INJECTION INTRAVENOUS; SUBCUTANEOUS at 22:07

## 2019-04-23 RX ADMIN — Medication 10 MILLIGRAM(S): at 00:50

## 2019-04-23 RX ADMIN — Medication 40 MILLIGRAM(S): at 06:27

## 2019-04-23 RX ADMIN — SEVELAMER CARBONATE 2400 MILLIGRAM(S): 2400 POWDER, FOR SUSPENSION ORAL at 22:07

## 2019-04-23 RX ADMIN — ONDANSETRON 4 MILLIGRAM(S): 8 TABLET, FILM COATED ORAL at 00:14

## 2019-04-23 RX ADMIN — Medication 100 MILLIGRAM(S): at 06:27

## 2019-04-23 NOTE — PROGRESS NOTE ADULT - ATTENDING COMMENTS
Jeovanny Rivera will be covering for me starting 4/24/19. He can be reached at  if needed.     - Dr. HARSHAL Israel ([facility])  - [phone]

## 2019-04-23 NOTE — CONSULT NOTE ADULT - ATTENDING COMMENTS
Patient seen and examined, agree with the above assessment and plan by MICAELA Shukla.  57 y/o F with HTN, ESRD on HD (TTS), gout, and depression p/w abd pain and nausea and exertional dyspnea  echo reveals mild-moderate MR with preserved LVEF  Nuclear stress negative for ischemia  CV stable  mgmt per med

## 2019-04-23 NOTE — PROGRESS NOTE ADULT - SUBJECTIVE AND OBJECTIVE BOX
Patient is a 58y old  Female who presents with a chief complaint of Abd pain and vomiting (23 Apr 2019 13:33)      SUBJECTIVE / OVERNIGHT EVENTS:  BP better.  reports exertional cp, so card consulted.  stress and TTE done.  no events on tele.  nausea better.   able to tolerate food.   abdominal better       Vital Signs Last 24 Hrs  T(C): 37.1 (23 Apr 2019 12:02), Max: 37.1 (23 Apr 2019 12:02)  T(F): 98.7 (23 Apr 2019 12:02), Max: 98.7 (23 Apr 2019 12:02)  HR: 80 (23 Apr 2019 12:02) (80 - 107)  BP: 140/89 (23 Apr 2019 12:02) (140/89 - 220/135)  BP(mean): --  RR: 18 (23 Apr 2019 12:02) (17 - 19)  SpO2: 97% (23 Apr 2019 12:02) (97% - 100%)  I&O's Summary      PHYSICAL EXAM:  GENERAL: NAD, Comfortable  HEAD:  Atraumatic, Normocephalic  EYES: EOMI, PERRLA, conjunctiva and sclera clear  NECK: Supple, No JVD  CHEST/LUNG: Clear to auscultation bilaterally; No wheeze  HEART: Regular rate and rhythm; No murmurs, rubs, or gallops  ABDOMEN: Soft, Nontender, Nondistended; Bowel sounds present  Neuro: AAO x 3, no focal deficit, 5/5 b/l extremities  EXTREMITIES:  2+ Peripheral Pulses, No clubbing, cyanosis, or edema  SKIN: No rashes or lesions    LABS:                        10.8   6.71  )-----------( 175      ( 23 Apr 2019 07:19 )             35.8     04-23    137  |  96<L>  |  50<H>  ----------------------------<  95  4.7   |  20<L>  |  9.04<H>    Ca    10.0      23 Apr 2019 07:19  Mg     2.4     04-23    TPro  7.4  /  Alb  4.2  /  TBili  0.8  /  DBili  x   /  AST  16  /  ALT  9   /  AlkPhos  121<H>  04-23      CAPILLARY BLOOD GLUCOSE                RADIOLOGY & ADDITIONAL TESTS:    Imaging Personally Reviewed:  [x] YES  [ ] NO    Consultant(s) Notes Reviewed:  [x] YES  [ ] NO      MEDICATIONS  (STANDING):  allopurinol 100 milliGRAM(s) Oral <User Schedule>  allopurinol 200 milliGRAM(s) Oral <User Schedule>  amLODIPine   Tablet 10 milliGRAM(s) Oral daily  cholecalciferol 2000 Unit(s) Oral two times a day  cloNIDine 0.2 milliGRAM(s) Oral three times a day  furosemide    Tablet 40 milliGRAM(s) Oral daily  heparin  Injectable 5000 Unit(s) SubCutaneous every 8 hours  metoprolol succinate  milliGRAM(s) Oral daily  sertraline 100 milliGRAM(s) Oral daily  sevelamer carbonate 2400 milliGRAM(s) Oral three times a day  sodium bicarbonate 1300 milliGRAM(s) Oral daily    MEDICATIONS  (PRN):  ondansetron Injectable 4 milliGRAM(s) IV Push every 8 hours PRN Nausea and/or Vomiting      Care Discussed with Consultants/Other Providers [x] YES  [ ] NO    HEALTH ISSUES - PROBLEM Dx:  Prophylactic measure: Prophylactic measure  ESRD (end stage renal disease) on dialysis: ESRD (end stage renal disease) on dialysis  Hypertensive urgency: Hypertensive urgency  Non-intractable vomiting with nausea, unspecified vomiting type: Non-intractable vomiting with nausea, unspecified vomiting type

## 2019-04-23 NOTE — CONSULT NOTE ADULT - CONSULT REASON
trops elevated/ cardiac eval trops elevated/ cardiac eval  hx htn, hld, spinal stenosis, ESRD on HD, depression. right breast CA s/p lumpectomy

## 2019-04-23 NOTE — CONSULT NOTE ADULT - SUBJECTIVE AND OBJECTIVE BOX
CARDIOLOGY CONSULT - Dr. Tirado     CHIEF COMPLAINT:    HPI:  57 y/o F with HTN, ESRD on HD (TTS), gout, and depression p/w abd pain and nausea.  Pt reports vomiting starting around 3am today along with dull epigastric pain.  No hematemesis or coffee grounds.  Normal BM's.  She reports subjective fever and chills.  No URI symptoms.  No sick contacts.  No headache or visual changes.  She has been unable to keep down food or her medications today.  She had similar presentation last fall, and winter, and had outpatient EGD with path showing reactive gastropathy.      In the ED, pt was given compazine, and Zofran with temporary relief of nausea.  She had BP elevated to 230/132 and was given Clonidine, labetalol, and losartan. (22 Apr 2019 19:49)      PAST MEDICAL & SURGICAL HISTORY:  HLD (hyperlipidemia)  Anemia due to chronic kidney disease  Spinal stenosis  Herniated lumbar intervertebral disc  Chronic kidney disease (CKD)  Gout  Depression  Breast CA, right  Hypertension  Renal insufficiency  S/P lumpectomy, right breast          PREVIOUS DIAGNOSTIC TESTING:    [ ] Echocardiogram:  [ ]  Catheterization:  [ ] Stress Test:  	    MEDICATIONS:  MEDICATIONS  (STANDING):  allopurinol 100 milliGRAM(s) Oral <User Schedule>  allopurinol 200 milliGRAM(s) Oral <User Schedule>  amLODIPine   Tablet 10 milliGRAM(s) Oral daily  cholecalciferol 2000 Unit(s) Oral two times a day  cloNIDine 0.2 milliGRAM(s) Oral three times a day  furosemide    Tablet 40 milliGRAM(s) Oral daily  heparin  Injectable 5000 Unit(s) SubCutaneous every 8 hours  metoprolol succinate  milliGRAM(s) Oral daily  sertraline 100 milliGRAM(s) Oral daily  sevelamer carbonate 2400 milliGRAM(s) Oral three times a day  sodium bicarbonate 1300 milliGRAM(s) Oral daily      FAMILY HISTORY:      SOCIAL HISTORY:    [ ] Non-smoker  [ ] Smoker  [ ] Alcohol    Allergies    penicillin (Unknown)    Intolerances    	    REVIEW OF SYSTEMS:  CONSTITUTIONAL: No fever, weight loss, or fatigue  EYES: No eye pain, visual disturbances, or discharge  ENMT:  No difficulty hearing, tinnitus, vertigo; No sinus or throat pain  NECK: No pain or stiffness  RESPIRATORY: No cough, wheezing, chills or hemoptysis; No Shortness of Breath  CARDIOVASCULAR: No chest pain, palpitations, passing out, dizziness, or leg swelling  GASTROINTESTINAL: No abdominal or epigastric pain. No nausea, vomiting, or hematemesis; No diarrhea or constipation. No melena or hematochezia.  GENITOURINARY: No dysuria, frequency, hematuria, or incontinence  NEUROLOGICAL: No headaches, memory loss, loss of strength, numbness, or tremors  SKIN: No itching, burning, rashes, or lesions   	    [ ] All others negative	  [ ] Unable to obtain    PHYSICAL EXAM:  T(C): 37.1 (04-23-19 @ 12:02), Max: 37.1 (04-23-19 @ 12:02)  HR: 80 (04-23-19 @ 12:02) (80 - 107)  BP: 140/89 (04-23-19 @ 12:02) (140/89 - 220/135)  RR: 18 (04-23-19 @ 12:02) (17 - 19)  SpO2: 97% (04-23-19 @ 12:02) (97% - 100%)  Wt(kg): --  I&O's Summary      Appearance: Normal	  Psychiatry: A & O x 3, Mood & affect appropriate  HEENT:   Normal oral mucosa, PERRL, EOMI	  Lymphatic: No lymphadenopathy  Cardiovascular: Normal S1 S2,RRR, No JVD, No murmurs  Respiratory: Lungs clear to auscultation	  Gastrointestinal:  Soft, Non-tender, + BS	  Skin: No rashes, No ecchymoses, No cyanosis	  Neurologic: Non-focal  Extremities: Normal range of motion, No clubbing, cyanosis or edema  Vascular: Peripheral pulses palpable 2+ bilaterally    TELEMETRY: 	    ECG:  	  RADIOLOGY:  OTHER: 	  	  LABS:	 	    CARDIAC MARKERS:                                  11.7   7.01  )-----------( 156      ( 22 Apr 2019 07:58 )             38.4     04-23    139  |  99  |  47<H>  ----------------------------<  101<H>  4.7   |  21<L>  |  8.96<H>    Ca    10.7<H>      23 Apr 2019 06:30  Mg     2.5     04-23    TPro  7.4  /  Alb  4.1  /  TBili  0.8  /  DBili  x   /  AST  17  /  ALT  10  /  AlkPhos  126<H>  04-23      proBNP:   Lipid Profile:   HgA1c:   TSH: CARDIOLOGY CONSULT - Dr. Tirado     CHIEF COMPLAINT:    HPI:  57 y/o F with HTN, ESRD on HD (TTS), gout, and depression p/w abd pain and nausea.  Patient being evaluated today for elevated HS trops/ cardiac evaluation. She reports exertional cp/ sob x 1 year. Denies hx of MI, CHF, valvular disease or arrhythmias. No recent cardiac testing. Denies cp at rest, palpitations, orthopnea, LE edema. fever or chills. ROS otherwise negative.       PAST MEDICAL & SURGICAL HISTORY:  HLD (hyperlipidemia)  Anemia due to chronic kidney disease  Spinal stenosis  Herniated lumbar intervertebral disc  Chronic kidney disease (CKD)  Gout  Depression  Breast CA, right  Hypertension  Renal insufficiency  S/P lumpectomy, right breast          PREVIOUS DIAGNOSTIC TESTING:    [ ] Echocardiogram:  < from: Transthoracic Echocardiogram (03.30.16 @ 11:19) >  Ejection Fraction (Teicholtz): 68 %  ------------------------------------------------------------------------  OBSERVATIONS:  Mitral Valve: Mitral annular calcification, otherwise  normal mitral valve. Mild mitral regurgitation.  Aortic Root: Normal aortic root.  Aortic Valve: Calcified trileaflet aortic valve with normal  opening. Mild aortic regurgitation.  Left Atrium: Mildly dilated left atrium.  LA volume index =  41 cc/m2.  Left Ventricle: Normal left ventricular systolic function.  No segmental wall motion abnormalities. Normal left  ventricular internal dimensions and wall thicknesses.  Right Heart: Normal right atrium. Normal right ventricular  size and function. Normal tricuspid valve.  Mild tricuspid  regurgitation. Normal pulmonic valve. Minimal pulmonic  regurgitation.  Pericardium/PleuraNormal pericardium with no pericardial  effusion.  Hemodynamic: Estimated right ventricular systolic pressure  equals 39 mm Hg, assuming right atrial pressure equals 10  mm Hg, consistent with borderline pulmonary hypertension.  ------------------------------------------------------------------------  CONCLUSIONS:  1. Mitral annular calcification, otherwise normal mitral  valve. Mild mitral regurgitation.  2. Calcified trileaflet aortic valve with normal opening.  Mild aortic regurgitation.  3. Mildly dilated left atrium.  LA volume index = 41 cc/m2.  4. Normal left ventricular internal dimensions and wall  thicknesses.  5. Normal left ventricular systolic function. No segmental  wall motion abnormalities.  6. Normal right ventricular size and function.  7. Estimated right ventricular systolic pressure equals 39  mm Hg, assuming right atrial pressure equals 10 mm Hg,  consistent with borderline pulmonary hypertension.  ------------------------------------------------------------------------  Confirmed on  3/30/2016 - 13:07:02 by James Huitron M.D.  ------------------------------------------------------------------------    < end of copied text >    [ ]  Catheterization:    [ ] Stress Test:  	  < from: Nuclear Stress Test-Pharmacologic (03.30.16 @ 13:41) >  IMPRESSIONS:Normal Study  * Myocardial Perfusion SPECT results are normal.  * Normal myocardial perfusion scan,with no evidence of  infarction or inducible ischemia.  * Post-stress gated wall motion analysis wasperformed by  [facility] (LVEF = 65 %;LVEDV = 142 ml.), revealing  normal LV function.  ------------------------------------------------------------------------  Confirmed on  3/30/2016 - 15:52:34 by Apple Pickering MD  ------------------------------------------------------------------------    < end of copied text >    MEDICATIONS:  MEDICATIONS  (STANDING):  allopurinol 100 milliGRAM(s) Oral <User Schedule>  allopurinol 200 milliGRAM(s) Oral <User Schedule>  amLODIPine   Tablet 10 milliGRAM(s) Oral daily  cholecalciferol 2000 Unit(s) Oral two times a day  cloNIDine 0.2 milliGRAM(s) Oral three times a day  furosemide    Tablet 40 milliGRAM(s) Oral daily  heparin  Injectable 5000 Unit(s) SubCutaneous every 8 hours  metoprolol succinate  milliGRAM(s) Oral daily  sertraline 100 milliGRAM(s) Oral daily  sevelamer carbonate 2400 milliGRAM(s) Oral three times a day  sodium bicarbonate 1300 milliGRAM(s) Oral daily      FAMILY HISTORY:      SOCIAL HISTORY:    [x ] Non-smoker  [ ] Smoker  [ ] Alcohol    Allergies    penicillin (Unknown)    Intolerances    	    REVIEW OF SYSTEMS:  CONSTITUTIONAL: No fever, weight loss, or fatigue  EYES: No eye pain, visual disturbances, or discharge  ENMT:  No difficulty hearing, tinnitus, vertigo; No sinus or throat pain  NECK: No pain or stiffness  RESPIRATORY: No cough, wheezing, chills or hemoptysis; + Shortness of Breath  CARDIOVASCULAR: No palpitations, passing out, dizziness, or leg swelling + chest pain, p  GASTROINTESTINAL: + abdominal pain. No nausea, vomiting, or hematemesis; No diarrhea or constipation. No melena or hematochezia.  GENITOURINARY: No dysuria, frequency, hematuria, or incontinence  NEUROLOGICAL: No headaches, memory loss, loss of strength, numbness, or tremors  SKIN: No itching, burning, rashes, or lesions   	    [ x] All others negative	  [ ] Unable to obtain    PHYSICAL EXAM:  T(C): 37.1 (04-23-19 @ 12:02), Max: 37.1 (04-23-19 @ 12:02)  HR: 80 (04-23-19 @ 12:02) (80 - 107)  BP: 140/89 (04-23-19 @ 12:02) (140/89 - 220/135)  RR: 18 (04-23-19 @ 12:02) (17 - 19)  SpO2: 97% (04-23-19 @ 12:02) (97% - 100%)  Wt(kg): --  I&O's Summary      Appearance: Normal	  Psychiatry: A & O x 3, Mood & affect appropriate  HEENT:   Normal oral mucosa, PERRL, EOMI	  Lymphatic: No lymphadenopathy  Cardiovascular: Normal S1 S2,RRR, No JVD, No murmurs  Respiratory: Lungs clear to auscultation	  Gastrointestinal:  Soft, Non-tender, + BS	  Skin: No rashes, No ecchymoses, No cyanosis	  Neurologic: Non-focal  Extremities: Normal range of motion, No clubbing, cyanosis or edema  Vascular: Peripheral pulses palpable 2+ bilaterally    TELEMETRY: 	    ECG:  sinus tachycardia hr 101, LAE  nonspecfic st abnl 	  RADIOLOGY:  < from: US Abdomen Limited (04.22.19 @ 11:22) >  IMPRESSION:     Distended gallbladder with tenderness over the epigastrium.    Increased right renal cortical echogenicity, consistent with renal   parenchymal disease.                  LENKA ARCEO M.D., ATTENDING RADIOLOGIST  This document has been electronically signed. Apr 22 2019 11:33AM        < end of copied text >    OTHER:   < from: Nuclear Stress Test-Pharmacologic (04.23.19 @ 15:01) >  NUCLEAR FINDINGS:  The left ventricle was normal in size. Normal myocardial  perfusion scan,with no evidence of infarction or inducible  ischemia.  ------------------------------------------------------------------------  GATED ANALYSIS:  Post-stress gated wall motion analysis was performed (LVEF  = 42 %;LVEDV = 160 ml.), revealing moderate hypokinesis.  ------------------------------------------------------------------------  IMPRESSIONS:Abnormal Study  * The left ventricle was normal insize. Normal myocardial  perfusion scan,with no evidence of infarction or inducible  ischemia.  * Post-stress gated wall motion analysis was performed  (LVEF = 42 %;LVEDV = 160 ml.), revealing moderate  hypokinesis.  * There is a non-specific cardiomyopathy.  ------------------------------------------------------------------------    < end of copied text >      	  < from: Transthoracic Echocardiogram (04.23.19 @ 13:00) >  Ejection Fraction (Teicholtz): 62 %  ------------------------------------------------------------------------  OBSERVATIONS:  Mitral Valve: Mitral annular calcification, otherwise  normal mitral valve. Mild-moderate mitral regurgitation.  Aortic Root: Normal aortic root.  Aortic Valve: Calcified trileaflet aortic valve with normal  opening.Mild aortic regurgitation.  Left Atrium: Severely dilated left atrium.  LA volume index  = 51 cc/m2.  Left Ventricle: Normal left ventricular systolic function.  No segmental wall motion abnormalities. Normal left  ventricular internal dimensions andwall thicknesses.  Right Heart: Severe right atrial enlargement. Normal right  ventricular size and function. Normal tricuspid valve.  Moderate-severe tricuspid regurgitation. Normal pulmonic  valve.  Pericardium/PleuraNormal pericardium with no pericardial  effusion.  Hemodynamic: Estimated right ventricular systolic pressure  equals 45 mm Hg, assuming right atrial pressure equals 10  mm Hg, consistent with mild pulmonary hypertension.  ------------------------------------------------------------------------  CONCLUSIONS:  1. Mitral annular calcification, otherwise normal mitral  valve. Mild-moderate mitral regurgitation.  2. Calcified trileaflet aortic valve with normal opening.  Mild aortic regurgitation.  3. Severely dilated left atrium.LA volume index = 51  cc/m2.  4. Normal left ventricular internal dimensions and wall  thicknesses.  5. Normal left ventricular systolic function. No segmental  wall motion abnormalities.  6. Severe right atrial enlargement.  7. Normal right ventricular size and function.  8. Estimated right ventricular systolic pressure equals 45  mm Hg, assuming right atrial pressure equals 10 mm Hg,  consistent with mild pulmonary hypertension.  9. Normal tricuspid valve.  Moderate-severe tricuspid  regurgitation.  ------------------------------------------------------------------------  Confirmed on  4/23/2019 - 15:40:06 by James Huitron M.D.  ------------------------------------------------------------------------    < end of copied text >    LABS:	 	    CARDIAC MARKERS:                                  11.7   7.01  )-----------( 156      ( 22 Apr 2019 07:58 )             38.4     04-23    139  |  99  |  47<H>  ----------------------------<  101<H>  4.7   |  21<L>  |  8.96<H>    Ca    10.7<H>      23 Apr 2019 06:30  Mg     2.5     04-23    TPro  7.4  /  Alb  4.1  /  TBili  0.8  /  DBili  x   /  AST  17  /  ALT  10  /  AlkPhos  126<H>  04-23      proBNP:   Lipid Profile:   HgA1c:   TSH:

## 2019-04-23 NOTE — PROVIDER CONTACT NOTE (OTHER) - SITUATION
Patient manual bp 168/100 before medications. No s/s of distress. No headaches. Patient given AM antihypertensives.

## 2019-04-23 NOTE — PROGRESS NOTE ADULT - PROBLEM SELECTOR PLAN 1
Unclear significance of distended GB on US.  Symptoms possibly from gastritis.  Lipase mildly elevated, but likely reactive.    - IV protonix  - Zofran PRN  - will consider GI eval if no improvement  - pt reports exertional cp/dyspnea for months. no recent work up.  card consulted s/p NM stress test/TTE.  no further work up per card.

## 2019-04-24 ENCOUNTER — TRANSCRIPTION ENCOUNTER (OUTPATIENT)
Age: 59
End: 2019-04-24

## 2019-04-24 DIAGNOSIS — D64.9 ANEMIA, UNSPECIFIED: ICD-10-CM

## 2019-04-24 DIAGNOSIS — I10 ESSENTIAL (PRIMARY) HYPERTENSION: ICD-10-CM

## 2019-04-24 DIAGNOSIS — N25.0 RENAL OSTEODYSTROPHY: ICD-10-CM

## 2019-04-24 DIAGNOSIS — E87.2 ACIDOSIS: ICD-10-CM

## 2019-04-24 LAB
ALBUMIN SERPL ELPH-MCNC: 3.9 G/DL — SIGNIFICANT CHANGE UP (ref 3.3–5)
ALP SERPL-CCNC: 104 U/L — SIGNIFICANT CHANGE UP (ref 40–120)
ALT FLD-CCNC: 10 U/L — SIGNIFICANT CHANGE UP (ref 4–33)
ANION GAP SERPL CALC-SCNC: 17 MMO/L — HIGH (ref 7–14)
AST SERPL-CCNC: 13 U/L — SIGNIFICANT CHANGE UP (ref 4–32)
BILIRUB SERPL-MCNC: 0.6 MG/DL — SIGNIFICANT CHANGE UP (ref 0.2–1.2)
BUN SERPL-MCNC: 58 MG/DL — HIGH (ref 7–23)
CALCIUM SERPL-MCNC: 9.4 MG/DL — SIGNIFICANT CHANGE UP (ref 8.4–10.5)
CHLORIDE SERPL-SCNC: 94 MMOL/L — LOW (ref 98–107)
CO2 SERPL-SCNC: 21 MMOL/L — LOW (ref 22–31)
CREAT SERPL-MCNC: 10.28 MG/DL — HIGH (ref 0.5–1.3)
FERRITIN SERPL-MCNC: 894.6 NG/ML — HIGH (ref 15–150)
GLUCOSE SERPL-MCNC: 98 MG/DL — SIGNIFICANT CHANGE UP (ref 70–99)
HAV IGM SER-ACNC: NONREACTIVE — SIGNIFICANT CHANGE UP
HBV CORE IGM SER-ACNC: NONREACTIVE — SIGNIFICANT CHANGE UP
HBV SURFACE AB SER-ACNC: 915.5 MLU/ML — SIGNIFICANT CHANGE UP
HBV SURFACE AG SER-ACNC: NEGATIVE — SIGNIFICANT CHANGE UP
HCT VFR BLD CALC: 31.6 % — LOW (ref 34.5–45)
HCV AB S/CO SERPL IA: 0.06 S/CO — SIGNIFICANT CHANGE UP (ref 0–0.99)
HCV AB SERPL-IMP: SIGNIFICANT CHANGE UP
HGB BLD-MCNC: 9.8 G/DL — LOW (ref 11.5–15.5)
IRON SATN MFR SERPL: 247 UG/DL — SIGNIFICANT CHANGE UP (ref 140–530)
IRON SATN MFR SERPL: 59 UG/DL — SIGNIFICANT CHANGE UP (ref 30–160)
MCHC RBC-ENTMCNC: 26.5 PG — LOW (ref 27–34)
MCHC RBC-ENTMCNC: 31 % — LOW (ref 32–36)
MCV RBC AUTO: 85.4 FL — SIGNIFICANT CHANGE UP (ref 80–100)
NRBC # FLD: 0.02 K/UL — SIGNIFICANT CHANGE UP (ref 0–0)
PLATELET # BLD AUTO: 136 K/UL — LOW (ref 150–400)
PMV BLD: 9.5 FL — SIGNIFICANT CHANGE UP (ref 7–13)
POTASSIUM SERPL-MCNC: 4.8 MMOL/L — SIGNIFICANT CHANGE UP (ref 3.5–5.3)
POTASSIUM SERPL-SCNC: 4.8 MMOL/L — SIGNIFICANT CHANGE UP (ref 3.5–5.3)
PROT SERPL-MCNC: 6.7 G/DL — SIGNIFICANT CHANGE UP (ref 6–8.3)
RBC # BLD: 3.7 M/UL — LOW (ref 3.8–5.2)
RBC # FLD: 19.9 % — HIGH (ref 10.3–14.5)
SODIUM SERPL-SCNC: 132 MMOL/L — LOW (ref 135–145)
TROPONIN T, HIGH SENSITIVITY: 65 NG/L — CRITICAL HIGH (ref ?–14)
UIBC SERPL-MCNC: 187.6 UG/DL — SIGNIFICANT CHANGE UP (ref 110–370)
WBC # BLD: 5.6 K/UL — SIGNIFICANT CHANGE UP (ref 3.8–10.5)
WBC # FLD AUTO: 5.6 K/UL — SIGNIFICANT CHANGE UP (ref 3.8–10.5)

## 2019-04-24 PROCEDURE — 99223 1ST HOSP IP/OBS HIGH 75: CPT | Mod: GC

## 2019-04-24 RX ORDER — ONDANSETRON 8 MG/1
1 TABLET, FILM COATED ORAL
Qty: 0 | Refills: 0 | COMMUNITY

## 2019-04-24 RX ORDER — ONDANSETRON 8 MG/1
1 TABLET, FILM COATED ORAL
Qty: 42 | Refills: 0 | OUTPATIENT
Start: 2019-04-24 | End: 2019-04-30

## 2019-04-24 RX ORDER — ATORVASTATIN CALCIUM 80 MG/1
1 TABLET, FILM COATED ORAL
Qty: 0 | Refills: 0 | COMMUNITY
Start: 2019-04-24

## 2019-04-24 RX ORDER — LIDOCAINE AND PRILOCAINE CREAM 25; 25 MG/G; MG/G
1 CREAM TOPICAL
Qty: 0 | Refills: 0 | COMMUNITY

## 2019-04-24 RX ADMIN — HEPARIN SODIUM 5000 UNIT(S): 5000 INJECTION INTRAVENOUS; SUBCUTANEOUS at 05:41

## 2019-04-24 RX ADMIN — Medication 0.2 MILLIGRAM(S): at 05:40

## 2019-04-24 RX ADMIN — SEVELAMER CARBONATE 2400 MILLIGRAM(S): 2400 POWDER, FOR SUSPENSION ORAL at 05:41

## 2019-04-24 RX ADMIN — Medication 0.2 MILLIGRAM(S): at 22:27

## 2019-04-24 RX ADMIN — HEPARIN SODIUM 5000 UNIT(S): 5000 INJECTION INTRAVENOUS; SUBCUTANEOUS at 13:43

## 2019-04-24 RX ADMIN — SEVELAMER CARBONATE 2400 MILLIGRAM(S): 2400 POWDER, FOR SUSPENSION ORAL at 20:24

## 2019-04-24 RX ADMIN — SERTRALINE 100 MILLIGRAM(S): 25 TABLET, FILM COATED ORAL at 13:41

## 2019-04-24 RX ADMIN — Medication 100 MILLIGRAM(S): at 09:06

## 2019-04-24 RX ADMIN — SEVELAMER CARBONATE 2400 MILLIGRAM(S): 2400 POWDER, FOR SUSPENSION ORAL at 13:41

## 2019-04-24 RX ADMIN — Medication 100 MILLIGRAM(S): at 05:40

## 2019-04-24 RX ADMIN — Medication 2000 UNIT(S): at 05:40

## 2019-04-24 RX ADMIN — AMLODIPINE BESYLATE 10 MILLIGRAM(S): 2.5 TABLET ORAL at 05:40

## 2019-04-24 RX ADMIN — Medication 40 MILLIGRAM(S): at 05:40

## 2019-04-24 RX ADMIN — HEPARIN SODIUM 5000 UNIT(S): 5000 INJECTION INTRAVENOUS; SUBCUTANEOUS at 22:27

## 2019-04-24 RX ADMIN — Medication 2000 UNIT(S): at 22:27

## 2019-04-24 NOTE — PROGRESS NOTE ADULT - PROBLEM SELECTOR PLAN 1
Unclear significance of distended GB on US.  Symptoms possibly from gastritis.  Lipase mildly elevated, but clinically doubt acute pancreatitis  - IV protonix  - Zofran PRN  - cont current diet  card consulted s/p unremarkable NM stress test/TTE.  no further work up per card.

## 2019-04-24 NOTE — DISCHARGE NOTE PROVIDER - HOSPITAL COURSE
59 y/o F with HTN, ESRD on HD (TTS), gout, and depression p/w abd pain and nausea.            Non-intractable vomiting with nausea, unspecified vomiting type.      -Unclear significance of distended GB on US.  Symptoms possibly from gastritis.  Lipase mildly elevated, but likely reactive.      - IV protonix    - Zofran PRN    - GI consult         Hypertensive urgency.       - resume home clonidine - continue amlodipine- continue metoprolol.     -Nuclear Stress test- LV normal in size. no evidence of infarction /ischemia.    * Post-stress gated wall motion analysis was performed    (LVEF = 42 %;LVEDV = 160 ml.), revealing moderate    hypokinesis.    * There is a non-specific cardiomyopathy.    ECHO-  Mild-moderate mitral regurgitation.Mild aortic regurgitation Severely dilated left atrium.  Normal LV Function Severe right atrial enlargement.mild pulmonary hypertension.    Moderate-severe tricuspid regurgitation.    Cardiology: No further inpt workup        ESRD (end stage renal disease) on dialysis.       - HD tomorrow.     Binders resumed    NaBicarb on hold as per nephro rec

## 2019-04-24 NOTE — PROGRESS NOTE ADULT - SUBJECTIVE AND OBJECTIVE BOX
She denies any nausea or abd pain today  No acute SOB or CP    Vital Signs Last 24 Hrs  T(C): 37 (24 Apr 2019 14:36), Max: 37.6 (23 Apr 2019 21:57)  T(F): 98.6 (24 Apr 2019 14:36), Max: 99.6 (23 Apr 2019 21:57)  HR: 72 (24 Apr 2019 14:36) (72 - 80)  BP: 129/86 (24 Apr 2019 14:36) (129/86 - 146/93)  BP(mean): --  RR: 18 (24 Apr 2019 14:36) (18 - 18)  SpO2: 100% (24 Apr 2019 14:36) (100% - 100%)    GENERAL: NAD, Comfortable  HEAD:  Atraumatic, Normocephalic  EYES: EOMI, PERRLA, conjunctiva and sclera clear  NECK: Supple, No JVD  CHEST/LUNG: Clear to auscultation bilaterally; No wheeze  HEART: Regular rate and rhythm; No murmurs, rubs, or gallops  ABDOMEN: Soft, Nontender, Nondistended; Bowel sounds present  Neuro: AAO x 3, no focal deficit, 5/5 b/l extremities  EXTREMITIES:  2+ Peripheral Pulses, No clubbing, cyanosis, or edema  SKIN: No rashes or lesions    LABS:                        9.8    5.60  )-----------( 136      ( 24 Apr 2019 06:00 )             31.6     04-24    132<L>  |  94<L>  |  58<H>  ----------------------------<  98  4.8   |  21<L>  |  10.28<H>    Ca    9.4      24 Apr 2019 06:00  Mg     2.4     04-23    TPro  6.7  /  Alb  3.9  /  TBili  0.6  /  DBili  x   /  AST  13  /  ALT  10  /  AlkPhos  104  04-24      CAPILLARY BLOOD GLUCOSE

## 2019-04-24 NOTE — CONSULT NOTE ADULT - SUBJECTIVE AND OBJECTIVE BOX
[facility] DIVISION OF KIDNEY DISEASES AND HYPERTENSION -- INITIAL CONSULT NOTE  --------------------------------------------------------------------------------  HPI:    57 y/o F with HTN, ESRD on HD (TTS), gout, and depression p/w abd pain and nausea.  Pt reports vomiting  along with dull epigastric pain. Pt reports symptoms started suddenly on admission day, denied cp/sob, fever, chills, exposure to sick contacts. In the ED, pt was given compazine, and Zofran with temporary relief of nausea.  She had BP elevated to 230/132 and was given Clonidine, labetalol, and losartan, admitted for intractable vomiting, evaluated by cardiology given labs showed elevated troponin, and prior symptoms of ORTIZ and moderate to severe TR underwent stress test and TTE, showing normal EF but moderate pulmonary HTN and right heart enlargment, NST negative. Nephrology consulted for ESRD HD Management     Pt receives HD at [facility]. Primary nephrologist Dr Waters. Treatment for 3.25 hrs. DW 65 kg. Last HD 04/20/19. During examination pt asymptomatic, no more vomiting, claims has had poor appetite, has been going down on dry weight. No more n/v/abdominal pain.     PAST HISTORY  --------------------------------------------------------------------------------  PAST MEDICAL & SURGICAL HISTORY:  HLD (hyperlipidemia)  Anemia due to chronic kidney disease  Spinal stenosis  Herniated lumbar intervertebral disc  Chronic kidney disease (CKD)  Gout  Depression  Breast CA, right  Hypertension  Renal insufficiency  S/P lumpectomy, right breast    FAMILY HISTORY:    PAST SOCIAL HISTORY:    ALLERGIES & MEDICATIONS  --------------------------------------------------------------------------------  Allergies    penicillin (Unknown)    Intolerances      Standing Inpatient Medications  allopurinol 100 milliGRAM(s) Oral <User Schedule>  allopurinol 200 milliGRAM(s) Oral <User Schedule>  amLODIPine   Tablet 10 milliGRAM(s) Oral daily  cholecalciferol 2000 Unit(s) Oral two times a day  cloNIDine 0.2 milliGRAM(s) Oral three times a day  furosemide    Tablet 40 milliGRAM(s) Oral daily  heparin  Injectable 5000 Unit(s) SubCutaneous every 8 hours  metoprolol succinate  milliGRAM(s) Oral daily  sertraline 100 milliGRAM(s) Oral daily  sevelamer carbonate 2400 milliGRAM(s) Oral three times a day  sodium bicarbonate 1300 milliGRAM(s) Oral daily    PRN Inpatient Medications  ondansetron Injectable 4 milliGRAM(s) IV Push every 8 hours PRN      REVIEW OF SYSTEMS  --------------------------------------------------------------------------------  Gen: + weight changes, +fatigue,  no fevers/chills, weakness  Respiratory: No dyspnea, cough, wheezing, hemoptysis  CV: No chest pain, PND, orthopnea  GI: No abdominal pain, diarrhea, constipation, +nausea, +vomiting,   MSK: no edema  Neuro: No dizziness/lightheadedness, weakness, seizures, numbness, tingling    All other systems were reviewed and are negative, except as noted.    VITALS/PHYSICAL EXAM  --------------------------------------------------------------------------------  T(C): 36.8 (04-24-19 @ 05:37), Max: 37.6 (04-23-19 @ 21:57)  HR: 73 (04-24-19 @ 05:37) (73 - 82)  BP: 139/97 (04-24-19 @ 05:37) (139/97 - 148/88)  RR: 18 (04-24-19 @ 05:37) (18 - 18)  SpO2: 100% (04-24-19 @ 05:37) (97% - 100%)  Wt(kg): --  Height (cm): 172.72 (04-22-19 @ 18:33)  Weight (kg): 65.6 (04-22-19 @ 18:33)  BMI (kg/m2): 22 (04-22-19 @ 18:33)  BSA (m2): 1.78 (04-22-19 @ 18:33)      Physical Exam:  	Gen: NAD  	HEENT: neck supple oral mucosa moist.   	Pulm: CTA B/L  	CV: S1S2  	Abd: Soft, +BS   	Ext: No LE edema B/L  	Neuro: Awake  	Skin: Warm and dry  	Vascular access: LUE AVF +thrill. + bruit  LABS/STUDIES  --------------------------------------------------------------------------------              9.8    5.60  >-----------<  136      [04-24-19 @ 06:00]              31.6     132  |  94  |  58  ----------------------------<  98      [04-24-19 @ 06:00]  4.8   |  21  |  10.28        Ca     9.4     [04-24-19 @ 06:00]      Mg     2.4     [04-23-19 @ 07:19]    TPro  6.7  /  Alb  3.9  /  TBili  0.6  /  DBili  x   /  AST  13  /  ALT  10  /  AlkPhos  104  [04-24-19 @ 06:00]        Creatinine Trend:  SCr 10.28 [04-24 @ 06:00]  SCr 9.04 [04-23 @ 07:19]  SCr 8.96 [04-23 @ 06:30]  SCr 7.41 [04-22 @ 07:58]

## 2019-04-24 NOTE — DISCHARGE NOTE PROVIDER - NSFOLLOWUPCLINICS_GEN_ALL_ED_FT
A Gastroenterologist  Gastroenterology  .  [place]   Phone:   Fax:     [facility]  Gastroenterology  [address]  [place] [address]  Phone: [phone]  Fax:   Follow Up Time: Routine    [facility]  Internal Medicine  [address] [place] [id number]  Phone: [phone]  Fax:   Follow Up Time: 7-10 Days

## 2019-04-24 NOTE — CONSULT NOTE ADULT - PROBLEM SELECTOR RECOMMENDATION 9
Pt. with ESRD on HD TIW (TTS) due to hypertensive nephrosclerosis as per pt. Last HD as outpatient was on 04/20/19. . Pt. clinically stable. Serum potassium WNL today. Will arrange for maintenance HD today. Monitor labs. Renal diet.

## 2019-04-24 NOTE — DISCHARGE NOTE NURSING/CASE MANAGEMENT/SOCIAL WORK - NSDCDPATPORTLINK_GEN_ALL_CORE
You can access the [facility] Patient Portal, offered by [facility], by registering with the following website: [email or web address]

## 2019-04-24 NOTE — DISCHARGE NOTE PROVIDER - NSDCCPCAREPLAN_GEN_ALL_CORE_FT
PRINCIPAL DISCHARGE DIAGNOSIS  Diagnosis: Nausea and vomiting  Assessment and Plan of Treatment: Non-intractable vomiting with nausea, unspecified vomiting type.      Symptoms likely  from gastritis.  Lipase mildly elevated, but likely reactive.  Follow up with outpatient gastrointestinal doctor for further management.      SECONDARY DISCHARGE DIAGNOSES  Diagnosis: ESRD (end stage renal disease) on dialysis  Assessment and Plan of Treatment: ESRD (end stage renal disease) on dialysis  Please follow up with your nephrologist for management, and continue you schedule hemodialysis.   Last HD at [facility] 4/24/19    Diagnosis: Anemia  Assessment and Plan of Treatment: Follow-up with your outpatient provider for further care/recommendations. Monitor for signs/symptoms indicating worsening of disease, such as, easy bleeding/bruising, pale skin, fatigue, dizziness, increased heart rate, or chest pain.    Diagnosis: Hypertension  Assessment and Plan of Treatment: Hypertension  Low sodium and fat diet, continue anti-hypertensive medications, and follow up with primary care physician.

## 2019-04-24 NOTE — PROGRESS NOTE ADULT - SUBJECTIVE AND OBJECTIVE BOX
CARDIOLOGY FOLLOW UP NOTE - DR. HORNE    Subjective:    no chest pain, sob  PHYSICAL EXAM:  T(C): 36.8 (04-24-19 @ 05:37), Max: 37.6 (04-23-19 @ 21:57)  HR: 73 (04-24-19 @ 05:37) (73 - 80)  BP: 139/97 (04-24-19 @ 05:37) (139/97 - 146/93)  RR: 18 (04-24-19 @ 05:37) (18 - 18)  SpO2: 100% (04-24-19 @ 05:37) (100% - 100%)  Wt(kg): --  I&O's Summary      Appearance: Normal	  Cardiovascular: Normal S1 S2,RRR,sm  Respiratory: Lungs clear to auscultation	  Gastrointestinal:  Soft, Non-tender, + BS	  Extremities: Normal range of motion, No clubbing, cyanosis or edema  Vascular: Peripheral pulses palpable 2+ bilaterally    MEDICATIONS  (STANDING):  allopurinol 100 milliGRAM(s) Oral <User Schedule>  allopurinol 200 milliGRAM(s) Oral <User Schedule>  amLODIPine   Tablet 10 milliGRAM(s) Oral daily  cholecalciferol 2000 Unit(s) Oral two times a day  cloNIDine 0.2 milliGRAM(s) Oral three times a day  furosemide    Tablet 40 milliGRAM(s) Oral daily  heparin  Injectable 5000 Unit(s) SubCutaneous every 8 hours  metoprolol succinate  milliGRAM(s) Oral daily  sertraline 100 milliGRAM(s) Oral daily  sevelamer carbonate 2400 milliGRAM(s) Oral three times a day      TELEMETRY: 	    ECG:  	  RADIOLOGY:   DIAGNOSTIC TESTING:  [ ] Echocardiogram:  [ ] Catheterization:  [ ] Stress Test:    OTHER: 	    LABS:	 	    CARDIAC MARKERS:                                9.8    5.60  )-----------( 136      ( 24 Apr 2019 06:00 )             31.6     04-24    132<L>  |  94<L>  |  58<H>  ----------------------------<  98  4.8   |  21<L>  |  10.28<H>    Ca    9.4      24 Apr 2019 06:00  Mg     2.4     04-23    TPro  6.7  /  Alb  3.9  /  TBili  0.6  /  DBili  x   /  AST  13  /  ALT  10  /  AlkPhos  104  04-24    proBNP:     Lipid Profile:   HgA1c:

## 2019-04-24 NOTE — CONSULT NOTE ADULT - PROBLEM/RECOMMENDATION-1
HISTORY OF PRESENT ILLNESS  Chelsie John is a 60 y.o. female. HPI  Chief Complaint   Patient presents with    Leg Swelling     FOLLOW UP     F/U cellulitis  States has gotten better with controlling edema of legs  Still has some edema and some redness. C/O not having portable O2 tank from [facility]. Review of Systems   Constitutional: Negative for chills and fever. HENT: Negative. Eyes: Negative. Negative for double vision, photophobia and pain. Respiratory: Negative. Cardiovascular: Negative. Negative for chest pain and leg swelling. Gastrointestinal: Negative. Skin: Positive for rash. C/O some swelling, leg redness and pain   Neurological: Negative. Physical Exam   Constitutional: She is oriented to person, place, and time. She appears well-developed and well-nourished. No distress. HENT:   Head: Normocephalic and atraumatic. Eyes: Conjunctivae are normal.   Cardiovascular: Normal rate, regular rhythm, normal heart sounds and intact distal pulses. Exam reveals no gallop and no friction rub. No murmur heard. 1+ edema B ankles   Pulmonary/Chest: Effort normal and breath sounds normal. No respiratory distress. She has no wheezes. She has no rales. Neurological: She is alert and oriented to person, place, and time. Skin: Skin is warm. Rash noted. She is not diaphoretic. There is erythema. Nursing note and vitals reviewed. Plan of care and AVS reviewed with patient who verbalized understanding. ASSESSMENT and PLAN    ICD-10-CM ICD-9-CM    1.  Cellulitis of lower extremity, unspecified laterality L03.119 682.6 cephALEXin (KEFLEX) 500 mg capsule      triamcinolone acetonide (KENALOG) 0.1 % ointment   Extended keflex x 7 days  Patient to continue fluid pill  Nursing assisting patient with getting O2,  F/U 2 weeks cellulitis DISPLAY PLAN FREE TEXT

## 2019-04-24 NOTE — DISCHARGE NOTE PROVIDER - CARE PROVIDER_API CALL
Eugenia Israel ()  Internal Medicine  [address] [place] [address]  Phone: [phone]  Fax: [phone]  Follow Up Time:

## 2019-04-24 NOTE — CONSULT NOTE ADULT - ASSESSMENT
Pt ESRD on HD. TTS.
58 year old female with htn, hld, spinal stenosis, ESRD on HD, depression. right breast CA s/p lumpectomy admitted with abd pain. Being evaluated today for elevated HS trop.     1. Chest pain   HS trop elevated, likely demand ischemia in the setting of ESRD   EKG no evidence of ACS   given reported cp/ sob exertion with increase CAD risk factors echo/ stress test ordered   Echo revealed EF 62%m normal LV sys fx, no segmental wall motion abnormality. severe Right atrial enlargement with mild pulm htn, mod to severe TR   Stress test revealed no evidence of infarct or ischemia. LVEF = 42%, revealing mod hypokinesis, non-specific cmp   no furhter cardiac workup  BP contol, cv stable, no decomp chf on exam continue BB     2. Mod to severe TR/ mild pulm HTN   cv stable, echo as mentioned above     3. ESRD on HD   renal f/u     4. HTN   bp now improving, continue with current anti-htn meds     5. Abd pain   abd US revealed distended gallbladder, med f/u    dvt ppx

## 2019-04-24 NOTE — CONSULT NOTE ADULT - PROBLEM SELECTOR RECOMMENDATION 3
Hb 9.8, today will start EPO.  Check iron profile, ferritin. Hb 9.8, today will give EPO with HD.  Check iron profile, ferritin.

## 2019-04-24 NOTE — PROGRESS NOTE ADULT - ASSESSMENT
58 year old female with htn, hld, spinal stenosis, ESRD on HD, depression. right breast CA s/p lumpectomy admitted with abd pain. Being evaluated today for elevated HS trop.     1. Chest pain, resolved  HS trop elevated, likely demand ischemia in the setting of ESRD   stress test with no ischemia, infarct mild lv dysfxn,  abnl ef possible artifac, echo with EF 62%m normal LV sys fx  no further cardiac workup indicated  continue BP control, no decomp chf on exam  continue BB     2. Mod to severe TR/ mild pulm HTN   cv stable    3. ESRD on HD   renal f/u     4. HTN   bp stable      5. Abd pain   abd US revealed distended gallbladder, med f/u    dvt ppx

## 2019-04-25 VITALS
TEMPERATURE: 98 F | SYSTOLIC BLOOD PRESSURE: 156 MMHG | HEART RATE: 73 BPM | OXYGEN SATURATION: 99 % | RESPIRATION RATE: 18 BRPM | DIASTOLIC BLOOD PRESSURE: 95 MMHG

## 2019-04-25 RX ADMIN — Medication 100 MILLIGRAM(S): at 05:48

## 2019-04-25 RX ADMIN — Medication 200 MILLIGRAM(S): at 08:24

## 2019-04-25 RX ADMIN — SEVELAMER CARBONATE 2400 MILLIGRAM(S): 2400 POWDER, FOR SUSPENSION ORAL at 08:25

## 2019-04-25 RX ADMIN — Medication 0.2 MILLIGRAM(S): at 05:47

## 2019-04-25 RX ADMIN — AMLODIPINE BESYLATE 10 MILLIGRAM(S): 2.5 TABLET ORAL at 05:47

## 2019-04-25 RX ADMIN — Medication 40 MILLIGRAM(S): at 05:47

## 2019-04-25 RX ADMIN — HEPARIN SODIUM 5000 UNIT(S): 5000 INJECTION INTRAVENOUS; SUBCUTANEOUS at 05:48

## 2019-05-22 ENCOUNTER — APPOINTMENT (OUTPATIENT)
Dept: TRANSPLANT | Facility: CLINIC | Age: 59
End: 2019-05-22
Payer: COMMERCIAL

## 2019-05-22 ENCOUNTER — APPOINTMENT (OUTPATIENT)
Dept: NEPHROLOGY | Facility: CLINIC | Age: 59
End: 2019-05-22

## 2019-05-22 DIAGNOSIS — Z76.82 AWAITING ORGAN TRANSPLANT STATUS: ICD-10-CM

## 2019-05-22 PROCEDURE — 99215 OFFICE O/P EST HI 40 MIN: CPT

## 2019-06-12 ENCOUNTER — APPOINTMENT (OUTPATIENT)
Dept: CARDIOLOGY | Facility: CLINIC | Age: 59
End: 2019-06-12

## 2019-07-22 ENCOUNTER — APPOINTMENT (OUTPATIENT)
Dept: RADIOLOGY | Facility: IMAGING CENTER | Age: 59
End: 2019-07-22

## 2019-07-22 ENCOUNTER — APPOINTMENT (OUTPATIENT)
Dept: ULTRASOUND IMAGING | Facility: IMAGING CENTER | Age: 59
End: 2019-07-22

## 2020-05-27 ENCOUNTER — APPOINTMENT (OUTPATIENT)
Dept: NEPHROLOGY | Facility: CLINIC | Age: 60
End: 2020-05-27

## 2020-08-20 NOTE — PATIENT PROFILE ADULT - NSPROGENDIFFINTUB_GEN_A_NUR
Number Of Freeze-Thaw Cycles: 2 freeze-thaw cycles Render Post-Care Instructions In Note?: yes Consent: The patient's consent was obtained including but not limited to risks of crusting, scabbing, blistering, scarring, darker or lighter pigmentary change, recurrence, incomplete removal and infection. Detail Level: Detailed Post-Care Instructions: I reviewed with the patient in detail post-care instructions. Patient is to wear sunprotection, and avoid picking at any of the treated lesions. Pt may apply Vaseline to crusted or scabbing areas. Render Note In Bullet Format When Appropriate: No Duration Of Freeze Thaw-Cycle (Seconds): 5 never intubated

## 2020-09-04 NOTE — ED ADULT NURSE NOTE - CHIEF COMPLAINT QUOTE
Presents to ED for abdominal pain.  Patient started experiencing abdominal pain with associated nausea, vomiting and dizziness this AM.  No chest pain, SOB or weakness.  History of HTN, ESRD with dialysis on Tu/TH/SAT.  Patient appears uncomfortable but in no apparent distress. 36.7

## 2020-11-21 NOTE — DISCHARGE NOTE ADULT - CARE PLAN
normal...
Principal Discharge DX:	Hypertensive urgency  Goal:	BP under control  Assessment and plan of treatment:	Continue your home blood pressure medications  Secondary Diagnosis:	ESRD (end stage renal disease)  Goal:	continue dialysis Tuesday, Thursday, Saturday  Secondary Diagnosis:	Depression  Goal:	Stable mood.  Secondary Diagnosis:	HLD (hyperlipidemia)  Secondary Diagnosis:	Nausea & vomiting  Goal:	Resolved  Assessment and plan of treatment:	continue zofran

## 2020-12-03 NOTE — ASU PATIENT PROFILE, ADULT - FALL HARM RISK CONCLUSION
Pt presents to the flu clinic for covid testing. Pt reports mild sx and possible exposure. Pt declined to see a doctor today.  PRABHUG Universal Safety Interventions

## 2021-09-20 NOTE — ED ADULT NURSE NOTE - OBJECTIVE STATEMENT
Well controlled    pt to room 23 c/o abd pain associated with nausea. pt with ESRD did not have dialysis today due to bp elevated . pt awake alert colot pale skin dry lungs cl abd soft tender.

## 2021-11-16 NOTE — H&P ADULT - NSHPPOAPULMEMBOLUS_GEN_A_CORE
Group Topic: BH Therapeutic Activity    Date: 11/16/2021  Start Time: 1445  End Time: 1545  Facilitators: Armando Torres LCSW    Focus: Guided Imagery Exercise, Therapeutic art activity  Number in attendance: 5    Group participated in a guided imagery exercise focusing on grounding and mindfulness and discussed.  Group then participated in a therapeutic art activity and casually socialized.        Pt was recruited for group but did not attend. Efforts to encourage participation in programming on the unit will continue.  ORLANDO Caba     no

## 2021-12-04 NOTE — PATIENT PROFILE ADULT - DO YOU FEEL UNSAFE AT HOME, WORK, OR SCHOOL?
Patient examined, record reviewed, agree with findings and recommendations as documented above.  
No Vaccines Administered.
no

## 2022-06-10 NOTE — H&P PST ADULT - GENERAL
negative Libtayo Pregnancy And Lactation Text: This medication is contraindicated in pregnancy and when breast feeding.

## 2023-09-13 NOTE — DISCHARGE NOTE ADULT - CONTRAINDICATIONS & PRECAUTIONS (SELECT ALL THAT APPLY)
[phone] mobile reached.  Conveyed results, patient verbalized understanding and no further questions.    
Please call the patient and let her know labs are normal, no sign of internal cause for itching.   
Patient/surrogate refused vaccine...

## 2024-01-23 NOTE — ED ADULT TRIAGE NOTE - CHIEF COMPLAINT QUOTE
n/v/ abdominal pain x this morning coming from Dialysis, did not get treatment
72 year old female with pmhx of Oa b/l knees, s/p right knee replacement (2014, revised 2018), Anxiety and depression, Overactive bladder, presents for pre-op evaluation for diagnosis of Carpal tunnel syndrome left upper limb. Patient is scheduled for Left wrist endoscopic carpal tunnel release. Patient c/o numbness and tingling and decrease strength on left hand.

## 2024-04-16 NOTE — ASU PATIENT PROFILE, ADULT - NS PRO INFO GIVEN TO
Electronically sent refills for Losartan ophthalmic drops to [facility] pharmacy.     WON Moss 3:09 PM 04/16/2024    
patient

## 2025-03-19 NOTE — H&P PST ADULT - OPHTHALMOLOGIC
- Stable on pantoprazole 20 mg daily. Continue same.   - Avoid triggering food and beverage.  - Will continue to monitor.        
  Orders:  •  Ambulatory Referral to Gastroenterology; Future  
  Orders:  •  Ambulatory Referral to Gastroenterology; Future  
  Orders:  •  Mammo screening bilateral w 3d and cad; Future  
  Orders:  •  Mammo screening bilateral w 3d and cad; Future  
- Discussed immunizations, screenings, healthy diet, exercise, and safety measures.  - She is due for updated blood work.   - Mammogram ordered.  - Referred to GI for colonoscopy.      
- Hemoglobin A1c elevated at 6.2  - Encourage healthy diet and regular exercise.  - Will continue to monitor fasting glucose and A1c.     
- Hemoglobin A1c elevated at 6.2  - Encourage healthy diet and regular exercise.  - Will continue to monitor fasting glucose and A1c.     
- Reviewed immunizations, screenings, healthy diet, exercise, and safety measures.  - She is due for updated blood work.  - Mammogram ordered.      
- Stable on pantoprazole 20 mg daily. Continue same.   - Avoid triggering food and beverage.  - Will continue to monitor.      
- Well controlled on Zoloft 25 mg daily. Continue same.   - Will continue to monitor.      
- Well controlled with use of trazodone at bedtime. She is currently taking half (25 mg) as needed.  - Will continue to monitor.      
- Well controlled. She is taking half tablet (25 mg) of trazodone at bedtime as needed. Continue same.   - Will continue to monitor.      
- s/p permanent pacemaker.   - Continue routine follow up with Cardiology.     
- s/p permanent pacemaker.   - Continue routine follow up with Cardiology.      
Component      Latest Ref Rng 6/17/2023 2/7/2024   Cholesterol      See Comment mg/dL 219 (H)  183    Triglycerides      See Comment mg/dL 87  86    HDL      >=50 mg/dL 84  79    LDL Calculated      0 - 100 mg/dL 118 (H)  87       - Well controlled.  - Continue Lipitor 10 mg daily.  - Encourage healthy diet and regular exercise.  - Will continue to monitor fasting lipid panel.      
Component      Latest Ref Rng 6/17/2023 2/7/2024   Cholesterol      See Comment mg/dL 219 (H)  183    Triglycerides      See Comment mg/dL 87  86    HDL      >=50 mg/dL 84  79    LDL Calculated      0 - 100 mg/dL 118 (H)  87       - Well controlled.  - Continue Lipitor 10 mg daily.  - Encourage healthy diet and regular exercise.  - Will continue to monitor fasting lipid panel.      
negative

## 2025-03-24 NOTE — CONSULT NOTE ADULT - PROBLEM/RECOMMENDATION-2
[facility]    NAME: Lisa Rich is a 67 y.o. female  : 1957    MRN: [id number]  DATE: 2025  TIME: 4:13 PM    Assessment and Plan   Dizzy [R42]  1. Dizzy        2. Nausea        3. Hyperglycemia            Patient Instructions     Patient Instructions   Recommend evaluation in the emergency room     Chief Complaint     Chief Complaint   Patient presents with    Vomiting    Nausea     Post steroid shot        History of Present Illness   67 year old female here with complaint of nausea, dizziness after getting steroid shot in her knee for arthritis.  Noticed new bruises that weren't there yesterday.  Has CKD stage 3, DM.  Ate lunch after getting the steroid shot which included fluffer jennie, lemon cake and iced tea.  Off Lithium for a month due to CKD, history of psychiatric disorder.        Review of Systems   Review of Systems   Constitutional:  Negative for appetite change, chills and fever.   HENT:  Negative for congestion, ear discharge, ear pain, facial swelling, postnasal drip, sinus pressure, sneezing and sore throat.    Respiratory:  Negative for cough, shortness of breath and wheezing.    Gastrointestinal:  Positive for nausea.   Neurological:  Positive for dizziness. Negative for headaches.   Hematological:  Bruises/bleeds easily.       Current Medications     Current Outpatient Medications:     aspirin (ECOTRIN LOW STRENGTH) 81 mg EC tablet, Take 81 mg by mouth daily, Disp: , Rfl:     Cholecalciferol (Vitamin D) 50 MCG (2000 UT) tablet, Take 1 tablet (2,000 Units total) by mouth daily, Disp: 90 tablet, Rfl: 3    clobetasol (TEMOVATE) 0.05 % external solution, Apply to dry scalp daily for up to 4 weeks.  Leave shampoo on the scalp x 15 minutes, then rinse. (Patient not taking: Reported on 10/14/2024), Disp: 50 mL, Rfl: 2    Diclofenac Sodium (VOLTAREN) 1 %, Apply 2 g topically 4 (four) times a day, Disp: 100 g, Rfl: 0    glucose blood (Contour Next Test) test strip,  Use 1 each 2 (two) times a day Use as instructed, Disp: 100 strip, Rfl: 5    glucose blood test strip, Use 1 each as needed Use as instructed, Disp: , Rfl:     levothyroxine 88 mcg tablet, Take 1 tablet (88 mcg total) by mouth daily, Disp: 90 tablet, Rfl: 3    linaGLIPtin (Tradjenta) 5 MG TABS, Take 5 mg by mouth daily, Disp: 90 tablet, Rfl: 3    lisinopril (ZESTRIL) 5 mg tablet, Take 1 tablet (5 mg total) by mouth daily, Disp: 90 tablet, Rfl: 1    pantoprazole (PROTONIX) 40 mg tablet, Take 1 tablet (40 mg total) by mouth daily (Patient not taking: Reported on 3/10/2025), Disp: 90 tablet, Rfl: 1    pravastatin (PRAVACHOL) 40 mg tablet, Take 1 tablet (40 mg total) by mouth daily, Disp: 90 tablet, Rfl: 3    QUEtiapine (SEROquel) 50 mg tablet, Take 50 mg by mouth daily at bedtime, Disp: , Rfl:     traZODone (DESYREL) 100 mg tablet, Take 200 mg by mouth daily at bedtime, Disp: , Rfl:   No current facility-administered medications for this visit.    Current Allergies     Allergies as of 03/24/2025    (No Known Allergies)          The following portions of the patient's history were reviewed and updated as appropriate: allergies, current medications, past family history, past medical history, past social history, past surgical history and problem list.   Past Medical History:   Diagnosis Date    Allergic     Depression     Diabetes mellitus (HCC)     Disease of thyroid gland     GERD (gastroesophageal reflux disease)     Hyperchloremia     Hypertension     Psychiatric disorder      Past Surgical History:   Procedure Laterality Date    ANKLE SURGERY      Incision    DILATION AND CURETTAGE OF UTERUS       Family History   Problem Relation Age of Onset    No Known Problems Family     Diabetes Mother     Heart disease Mother     Stroke Mother     Diabetes Father     Stroke Father     No Known Problems Maternal Grandmother     No Known Problems Maternal Grandfather     No Known Problems Paternal Grandmother     No Known  Problems Paternal Grandfather     Diabetes Brother     No Known Problems Paternal Aunt      Social History     Socioeconomic History    Marital status: Single     Spouse name: Not on file    Number of children: Not on file    Years of education: Not on file    Highest education level: Not on file   Occupational History    Not on file   Tobacco Use    Smoking status: Never     Passive exposure: Never    Smokeless tobacco: Never   Vaping Use    Vaping status: Never Used   Substance and Sexual Activity    Alcohol use: Not Currently    Drug use: No    Sexual activity: Not Currently   Other Topics Concern    Not on file   Social History Narrative    Not on file     Social Drivers of Health     Financial Resource Strain: Low Risk  (4/29/2024)    [facility]    Overall Financial Resource Strain (CARDIA)     Difficulty of Paying Living Expenses: Not very hard   Food Insecurity: No Food Insecurity (10/5/2024)    Nursing - Inadequate Food Risk Classification     Worried About Running Out of Food in the Last Year: Never true     Ran Out of Food in the Last Year: Never true     Ran Out of Food in the Last Year: Not on file   Transportation Needs: No Transportation Needs (10/5/2024)    PRAPARE - Transportation     Lack of Transportation (Medical): No     Lack of Transportation (Non-Medical): No   Physical Activity: Not on file   Stress: Not on file   Social Connections: Unknown (11/1/2024)    Received from [facility]    Social [employer]     How often do you feel lonely or isolated from those around you? (Adult - for ages 18 years and over): Not on file   Intimate Partner Violence: Not At Risk (4/29/2024)    [facility]    Humiliation, Afraid, Rape, and Kick questionnaire     Fear of Current or Ex-Partner: No     Emotionally Abused: No     Physically Abused: No     Sexually  Abused: No   Housing Stability: Low Risk  (10/5/2024)    Housing Stability Vital Sign     Unable to Pay for Housing in the Last Year: No     Number of Times Moved in the Last Year: 0     Homeless in the Last Year: No     Medications have been verified.    Objective   /62   Pulse 87   Temp 97.7 °F (36.5 °C)   Resp 20   SpO2 99%      Physical Exam   Physical Exam  Vitals and nursing note reviewed.   HENT:      Head: Normocephalic and atraumatic.   Cardiovascular:      Rate and Rhythm: Normal rate and regular rhythm.      Pulses: Normal pulses.      Heart sounds: Normal heart sounds.   Skin:     Findings: Bruising (above right knee, left arm) present.                      DISPLAY PLAN FREE TEXT

## 2025-06-20 NOTE — ED PROVIDER NOTE - PRO INTERPRETER NEED 2
English